# Patient Record
Sex: MALE | Race: WHITE | NOT HISPANIC OR LATINO
[De-identification: names, ages, dates, MRNs, and addresses within clinical notes are randomized per-mention and may not be internally consistent; named-entity substitution may affect disease eponyms.]

---

## 2017-06-21 ENCOUNTER — APPOINTMENT (OUTPATIENT)
Dept: ALLERGY | Facility: CLINIC | Age: 67
End: 2017-06-21

## 2017-06-21 VITALS
HEART RATE: 80 BPM | WEIGHT: 260 LBS | RESPIRATION RATE: 14 BRPM | BODY MASS INDEX: 40.81 KG/M2 | HEIGHT: 67 IN | DIASTOLIC BLOOD PRESSURE: 80 MMHG | SYSTOLIC BLOOD PRESSURE: 136 MMHG

## 2017-06-21 RX ORDER — LEVOTHYROXINE SODIUM 137 UG/1
TABLET ORAL
Refills: 0 | Status: ACTIVE | COMMUNITY

## 2017-06-21 RX ORDER — TESTOSTERONE 10 MG/G
GEL TRANSDERMAL
Refills: 0 | Status: ACTIVE | COMMUNITY

## 2017-06-21 RX ORDER — GLIPIZIDE 2.5 MG/1
TABLET ORAL
Refills: 0 | Status: ACTIVE | COMMUNITY

## 2017-06-21 RX ORDER — LOSARTAN POTASSIUM 100 MG/1
TABLET, FILM COATED ORAL
Refills: 0 | Status: DISCONTINUED | COMMUNITY
End: 2017-06-21

## 2017-07-10 ENCOUNTER — APPOINTMENT (OUTPATIENT)
Dept: ALLERGY | Facility: CLINIC | Age: 67
End: 2017-07-10

## 2017-07-10 VITALS
HEART RATE: 80 BPM | HEIGHT: 67 IN | RESPIRATION RATE: 14 BRPM | WEIGHT: 262 LBS | SYSTOLIC BLOOD PRESSURE: 146 MMHG | BODY MASS INDEX: 41.12 KG/M2 | DIASTOLIC BLOOD PRESSURE: 80 MMHG

## 2017-08-21 ENCOUNTER — APPOINTMENT (OUTPATIENT)
Dept: ALLERGY | Facility: CLINIC | Age: 67
End: 2017-08-21

## 2017-10-10 ENCOUNTER — APPOINTMENT (OUTPATIENT)
Dept: ALLERGY | Facility: CLINIC | Age: 67
End: 2017-10-10
Payer: MEDICARE

## 2017-10-10 VITALS
RESPIRATION RATE: 14 BRPM | BODY MASS INDEX: 25.43 KG/M2 | DIASTOLIC BLOOD PRESSURE: 82 MMHG | WEIGHT: 162 LBS | SYSTOLIC BLOOD PRESSURE: 140 MMHG | HEART RATE: 68 BPM | HEIGHT: 67 IN

## 2017-10-10 PROCEDURE — 99214 OFFICE O/P EST MOD 30 MIN: CPT

## 2017-10-19 ENCOUNTER — APPOINTMENT (OUTPATIENT)
Dept: ALLERGY | Facility: CLINIC | Age: 67
End: 2017-10-19
Payer: MEDICARE

## 2017-10-19 PROCEDURE — 99214 OFFICE O/P EST MOD 30 MIN: CPT | Mod: 25

## 2017-10-19 PROCEDURE — 96401 CHEMO ANTI-NEOPL SQ/IM: CPT

## 2017-10-24 ENCOUNTER — MED ADMIN CHARGE (OUTPATIENT)
Age: 67
End: 2017-10-24

## 2017-10-24 RX ORDER — OMALIZUMAB 202.5 MG/1.4ML
150 INJECTION, SOLUTION SUBCUTANEOUS
Qty: 1 | Refills: 0 | Status: COMPLETED | OUTPATIENT
Start: 2017-10-24

## 2017-10-24 RX ADMIN — OMALIZUMAB 1 MG: 202.5 INJECTION, SOLUTION SUBCUTANEOUS at 00:00

## 2017-11-16 ENCOUNTER — MED ADMIN CHARGE (OUTPATIENT)
Age: 67
End: 2017-11-16

## 2017-11-16 ENCOUNTER — APPOINTMENT (OUTPATIENT)
Dept: ALLERGY | Facility: CLINIC | Age: 67
End: 2017-11-16
Payer: MEDICARE

## 2017-11-16 VITALS
RESPIRATION RATE: 14 BRPM | DIASTOLIC BLOOD PRESSURE: 80 MMHG | SYSTOLIC BLOOD PRESSURE: 140 MMHG | HEIGHT: 67 IN | HEART RATE: 72 BPM | WEIGHT: 265 LBS | BODY MASS INDEX: 41.59 KG/M2

## 2017-11-16 PROCEDURE — 96372 THER/PROPH/DIAG INJ SC/IM: CPT | Mod: 59

## 2017-11-16 PROCEDURE — 99214 OFFICE O/P EST MOD 30 MIN: CPT | Mod: 25

## 2017-11-16 RX ORDER — OMALIZUMAB 202.5 MG/1.4ML
150 INJECTION, SOLUTION SUBCUTANEOUS
Qty: 1 | Refills: 0 | Status: COMPLETED | OUTPATIENT
Start: 2017-11-16

## 2017-11-16 RX ORDER — ATORVASTATIN CALCIUM 80 MG/1
TABLET, FILM COATED ORAL
Refills: 0 | Status: ACTIVE | COMMUNITY

## 2017-11-16 RX ORDER — PREDNISONE 10 MG/1
10 TABLET ORAL
Qty: 27 | Refills: 0 | Status: DISCONTINUED | COMMUNITY
Start: 2017-07-10 | End: 2017-11-16

## 2017-11-16 RX ORDER — PREDNISONE 10 MG/1
10 TABLET ORAL
Qty: 30 | Refills: 0 | Status: DISCONTINUED | COMMUNITY
Start: 2017-06-21 | End: 2017-11-16

## 2017-11-16 RX ADMIN — OMALIZUMAB 1 MG: 202.5 INJECTION, SOLUTION SUBCUTANEOUS at 00:00

## 2017-11-22 ENCOUNTER — MESSAGE (OUTPATIENT)
Age: 67
End: 2017-11-22

## 2017-12-14 ENCOUNTER — APPOINTMENT (OUTPATIENT)
Dept: ALLERGY | Facility: CLINIC | Age: 67
End: 2017-12-14
Payer: MEDICARE

## 2017-12-14 PROCEDURE — 95117 IMMUNOTHERAPY INJECTIONS: CPT

## 2017-12-14 PROCEDURE — 96372 THER/PROPH/DIAG INJ SC/IM: CPT | Mod: 59

## 2017-12-14 PROCEDURE — 99213 OFFICE O/P EST LOW 20 MIN: CPT | Mod: 25

## 2017-12-18 RX ORDER — OMALIZUMAB 202.5 MG/1.4ML
150 INJECTION, SOLUTION SUBCUTANEOUS
Qty: 1 | Refills: 0 | Status: COMPLETED | OUTPATIENT
Start: 2017-12-18

## 2017-12-18 RX ADMIN — OMALIZUMAB 150 MG: 202.5 INJECTION, SOLUTION SUBCUTANEOUS at 00:00

## 2018-01-11 ENCOUNTER — APPOINTMENT (OUTPATIENT)
Dept: ALLERGY | Facility: CLINIC | Age: 68
End: 2018-01-11

## 2018-01-18 ENCOUNTER — APPOINTMENT (OUTPATIENT)
Dept: ALLERGY | Facility: CLINIC | Age: 68
End: 2018-01-18
Payer: MEDICARE

## 2018-01-18 VITALS — HEART RATE: 78 BPM | SYSTOLIC BLOOD PRESSURE: 138 MMHG | DIASTOLIC BLOOD PRESSURE: 80 MMHG

## 2018-01-18 PROCEDURE — 96372 THER/PROPH/DIAG INJ SC/IM: CPT | Mod: 59

## 2018-01-18 RX ORDER — OMALIZUMAB 202.5 MG/1.4ML
150 INJECTION, SOLUTION SUBCUTANEOUS
Qty: 1 | Refills: 0 | Status: COMPLETED | OUTPATIENT
Start: 2018-01-18

## 2018-01-18 RX ADMIN — OMALIZUMAB 0 MG: 202.5 INJECTION, SOLUTION SUBCUTANEOUS at 00:00

## 2018-02-15 ENCOUNTER — APPOINTMENT (OUTPATIENT)
Dept: ALLERGY | Facility: CLINIC | Age: 68
End: 2018-02-15
Payer: MEDICARE

## 2018-02-15 PROCEDURE — 96372 THER/PROPH/DIAG INJ SC/IM: CPT | Mod: 59

## 2018-02-15 RX ORDER — OMALIZUMAB 202.5 MG/1.4ML
150 INJECTION, SOLUTION SUBCUTANEOUS
Qty: 1 | Refills: 0 | Status: COMPLETED | OUTPATIENT
Start: 2018-02-15

## 2018-02-15 RX ADMIN — OMALIZUMAB 0 MG: 202.5 INJECTION, SOLUTION SUBCUTANEOUS at 00:00

## 2018-03-15 ENCOUNTER — APPOINTMENT (OUTPATIENT)
Dept: ALLERGY | Facility: CLINIC | Age: 68
End: 2018-03-15
Payer: MEDICARE

## 2018-03-15 PROCEDURE — 96372 THER/PROPH/DIAG INJ SC/IM: CPT

## 2018-03-15 PROCEDURE — 99213 OFFICE O/P EST LOW 20 MIN: CPT | Mod: 25

## 2018-03-15 RX ORDER — OMALIZUMAB 202.5 MG/1.4ML
150 INJECTION, SOLUTION SUBCUTANEOUS
Qty: 1 | Refills: 0 | Status: COMPLETED | OUTPATIENT
Start: 2018-03-15 | End: 2018-03-15

## 2018-03-15 RX ADMIN — OMALIZUMAB 0 MG: 202.5 INJECTION, SOLUTION SUBCUTANEOUS at 00:00

## 2018-03-16 RX ORDER — OMALIZUMAB 202.5 MG/1.4ML
150 INJECTION, SOLUTION SUBCUTANEOUS
Qty: 1 | Refills: 0 | Status: COMPLETED | OUTPATIENT
Start: 2018-03-15

## 2018-04-12 ENCOUNTER — APPOINTMENT (OUTPATIENT)
Dept: ALLERGY | Facility: CLINIC | Age: 68
End: 2018-04-12
Payer: MEDICARE

## 2018-04-12 PROCEDURE — 96372 THER/PROPH/DIAG INJ SC/IM: CPT | Mod: 59

## 2018-04-12 PROCEDURE — 99213 OFFICE O/P EST LOW 20 MIN: CPT | Mod: 25

## 2018-04-12 RX ORDER — AZELASTINE HYDROCHLORIDE 0.5 MG/ML
0.05 SOLUTION/ DROPS OPHTHALMIC
Qty: 1 | Refills: 2 | Status: ACTIVE | COMMUNITY
Start: 2018-04-12 | End: 1900-01-01

## 2018-04-12 RX ADMIN — OMALIZUMAB 0 MG: 202.5 INJECTION, SOLUTION SUBCUTANEOUS at 00:00

## 2018-05-02 RX ORDER — OMALIZUMAB 202.5 MG/1.4ML
150 INJECTION, SOLUTION SUBCUTANEOUS
Qty: 1 | Refills: 0 | Status: COMPLETED | OUTPATIENT
Start: 2018-04-12

## 2018-05-03 RX ORDER — OMALIZUMAB 202.5 MG/1.4ML
150 INJECTION, SOLUTION SUBCUTANEOUS
Qty: 1 | Refills: 0 | Status: COMPLETED | OUTPATIENT
Start: 2018-04-12

## 2018-05-10 ENCOUNTER — APPOINTMENT (OUTPATIENT)
Dept: ALLERGY | Facility: CLINIC | Age: 68
End: 2018-05-10

## 2018-05-15 ENCOUNTER — APPOINTMENT (OUTPATIENT)
Dept: ALLERGY | Facility: CLINIC | Age: 68
End: 2018-05-15
Payer: MEDICARE

## 2018-05-15 VITALS — HEART RATE: 72 BPM | DIASTOLIC BLOOD PRESSURE: 80 MMHG | SYSTOLIC BLOOD PRESSURE: 140 MMHG

## 2018-05-15 PROCEDURE — 96372 THER/PROPH/DIAG INJ SC/IM: CPT

## 2018-05-15 RX ORDER — OMALIZUMAB 202.5 MG/1.4ML
150 INJECTION, SOLUTION SUBCUTANEOUS
Qty: 1 | Refills: 0 | Status: COMPLETED | OUTPATIENT
Start: 2018-05-15

## 2018-05-15 RX ADMIN — OMALIZUMAB 0 MG: 202.5 INJECTION, SOLUTION SUBCUTANEOUS at 00:00

## 2018-06-20 ENCOUNTER — APPOINTMENT (OUTPATIENT)
Dept: ALLERGY | Facility: CLINIC | Age: 68
End: 2018-06-20
Payer: MEDICARE

## 2018-06-20 VITALS — HEART RATE: 74 BPM | RESPIRATION RATE: 14 BRPM | DIASTOLIC BLOOD PRESSURE: 82 MMHG | SYSTOLIC BLOOD PRESSURE: 136 MMHG

## 2018-06-20 PROCEDURE — 96372 THER/PROPH/DIAG INJ SC/IM: CPT | Mod: 59

## 2018-06-20 RX ORDER — OMALIZUMAB 202.5 MG/1.4ML
150 INJECTION, SOLUTION SUBCUTANEOUS
Qty: 1 | Refills: 0 | Status: COMPLETED | OUTPATIENT
Start: 2018-06-20

## 2018-06-20 RX ADMIN — OMALIZUMAB 0 MG: 202.5 INJECTION, SOLUTION SUBCUTANEOUS at 00:00

## 2018-07-25 ENCOUNTER — APPOINTMENT (OUTPATIENT)
Dept: ALLERGY | Facility: CLINIC | Age: 68
End: 2018-07-25
Payer: MEDICARE

## 2018-07-25 PROCEDURE — 96372 THER/PROPH/DIAG INJ SC/IM: CPT

## 2018-07-25 RX ORDER — OMALIZUMAB 202.5 MG/1.4ML
150 INJECTION, SOLUTION SUBCUTANEOUS
Qty: 1 | Refills: 0 | Status: COMPLETED | OUTPATIENT
Start: 2018-07-25

## 2018-07-25 RX ADMIN — OMALIZUMAB 0 MG: 202.5 INJECTION, SOLUTION SUBCUTANEOUS at 00:00

## 2018-08-30 ENCOUNTER — APPOINTMENT (OUTPATIENT)
Dept: ALLERGY | Facility: CLINIC | Age: 68
End: 2018-08-30
Payer: MEDICARE

## 2018-08-30 PROCEDURE — 96372 THER/PROPH/DIAG INJ SC/IM: CPT

## 2018-09-12 VITALS — DIASTOLIC BLOOD PRESSURE: 80 MMHG | RESPIRATION RATE: 16 BRPM | SYSTOLIC BLOOD PRESSURE: 150 MMHG | HEART RATE: 72 BPM

## 2018-09-12 RX ORDER — OMALIZUMAB 202.5 MG/1.4ML
150 INJECTION, SOLUTION SUBCUTANEOUS
Qty: 1 | Refills: 0 | Status: COMPLETED | OUTPATIENT
Start: 2018-09-12

## 2018-09-12 RX ADMIN — OMALIZUMAB 0 MG: 202.5 INJECTION, SOLUTION SUBCUTANEOUS at 00:00

## 2018-10-04 ENCOUNTER — APPOINTMENT (OUTPATIENT)
Dept: ALLERGY | Facility: CLINIC | Age: 68
End: 2018-10-04
Payer: MEDICARE

## 2018-10-04 PROCEDURE — 96372 THER/PROPH/DIAG INJ SC/IM: CPT | Mod: 59

## 2018-10-08 VITALS — DIASTOLIC BLOOD PRESSURE: 84 MMHG | SYSTOLIC BLOOD PRESSURE: 144 MMHG | HEART RATE: 77 BPM | RESPIRATION RATE: 14 BRPM

## 2018-10-08 RX ORDER — OMALIZUMAB 202.5 MG/1.4ML
150 INJECTION, SOLUTION SUBCUTANEOUS
Qty: 1 | Refills: 0 | Status: COMPLETED | OUTPATIENT
Start: 2018-10-08

## 2018-10-08 RX ADMIN — OMALIZUMAB 0 MG: 202.5 INJECTION, SOLUTION SUBCUTANEOUS at 00:00

## 2018-11-08 ENCOUNTER — APPOINTMENT (OUTPATIENT)
Dept: ALLERGY | Facility: CLINIC | Age: 68
End: 2018-11-08
Payer: MEDICARE

## 2018-11-08 VITALS — RESPIRATION RATE: 14 BRPM | DIASTOLIC BLOOD PRESSURE: 82 MMHG | SYSTOLIC BLOOD PRESSURE: 140 MMHG

## 2018-11-08 PROCEDURE — 96372 THER/PROPH/DIAG INJ SC/IM: CPT | Mod: 59

## 2018-11-08 RX ORDER — OMALIZUMAB 202.5 MG/1.4ML
150 INJECTION, SOLUTION SUBCUTANEOUS
Qty: 1 | Refills: 0 | Status: COMPLETED | OUTPATIENT
Start: 2018-11-08

## 2018-11-08 RX ADMIN — OMALIZUMAB 0 MG: 202.5 INJECTION, SOLUTION SUBCUTANEOUS at 00:00

## 2018-11-21 ENCOUNTER — APPOINTMENT (OUTPATIENT)
Dept: ORTHOPEDIC SURGERY | Facility: CLINIC | Age: 68
End: 2018-11-21
Payer: MEDICARE

## 2018-11-21 VITALS — HEIGHT: 67 IN | WEIGHT: 265 LBS | BODY MASS INDEX: 41.59 KG/M2

## 2018-11-21 DIAGNOSIS — Z00.00 ENCOUNTER FOR GENERAL ADULT MEDICAL EXAMINATION W/OUT ABNORMAL FINDINGS: ICD-10-CM

## 2018-11-21 PROCEDURE — 72170 X-RAY EXAM OF PELVIS: CPT

## 2018-11-21 PROCEDURE — 99213 OFFICE O/P EST LOW 20 MIN: CPT

## 2018-11-21 PROCEDURE — 72100 X-RAY EXAM L-S SPINE 2/3 VWS: CPT

## 2018-12-11 ENCOUNTER — APPOINTMENT (OUTPATIENT)
Dept: ALLERGY | Facility: CLINIC | Age: 68
End: 2018-12-11
Payer: MEDICARE

## 2018-12-11 VITALS — RESPIRATION RATE: 14 BRPM | SYSTOLIC BLOOD PRESSURE: 138 MMHG | DIASTOLIC BLOOD PRESSURE: 80 MMHG | HEART RATE: 80 BPM

## 2018-12-11 PROCEDURE — 96372 THER/PROPH/DIAG INJ SC/IM: CPT

## 2018-12-11 PROCEDURE — 99213 OFFICE O/P EST LOW 20 MIN: CPT | Mod: 25

## 2018-12-11 RX ORDER — OMALIZUMAB 202.5 MG/1.4ML
150 INJECTION, SOLUTION SUBCUTANEOUS
Qty: 1 | Refills: 0 | Status: COMPLETED | OUTPATIENT
Start: 2018-12-11

## 2018-12-11 RX ADMIN — OMALIZUMAB 0 MG: 202.5 INJECTION, SOLUTION SUBCUTANEOUS at 00:00

## 2018-12-20 ENCOUNTER — APPOINTMENT (OUTPATIENT)
Dept: ORTHOPEDIC SURGERY | Facility: CLINIC | Age: 68
End: 2018-12-20
Payer: MEDICARE

## 2018-12-20 VITALS — BODY MASS INDEX: 41.59 KG/M2 | WEIGHT: 265 LBS | HEIGHT: 67 IN

## 2018-12-20 PROCEDURE — 99213 OFFICE O/P EST LOW 20 MIN: CPT

## 2019-01-17 ENCOUNTER — APPOINTMENT (OUTPATIENT)
Dept: ALLERGY | Facility: CLINIC | Age: 69
End: 2019-01-17
Payer: MEDICARE

## 2019-01-17 VITALS — RESPIRATION RATE: 14 BRPM | DIASTOLIC BLOOD PRESSURE: 84 MMHG | HEART RATE: 70 BPM | SYSTOLIC BLOOD PRESSURE: 142 MMHG

## 2019-01-17 PROCEDURE — 96372 THER/PROPH/DIAG INJ SC/IM: CPT | Mod: 59

## 2019-01-17 RX ORDER — OMALIZUMAB 202.5 MG/1.4ML
150 INJECTION, SOLUTION SUBCUTANEOUS
Qty: 1 | Refills: 0 | Status: COMPLETED | OUTPATIENT
Start: 2019-01-17

## 2019-01-17 RX ADMIN — OMALIZUMAB 0 MG: 202.5 INJECTION, SOLUTION SUBCUTANEOUS at 00:00

## 2019-01-23 ENCOUNTER — APPOINTMENT (OUTPATIENT)
Dept: ORTHOPEDIC SURGERY | Facility: CLINIC | Age: 69
End: 2019-01-23
Payer: MEDICARE

## 2019-01-23 VITALS — WEIGHT: 265 LBS | BODY MASS INDEX: 41.59 KG/M2 | HEIGHT: 67 IN

## 2019-01-23 DIAGNOSIS — S69.82XA OTHER SPECIFIED INJURIES OF LEFT WRIST, HAND AND FINGER(S), INITIAL ENCOUNTER: ICD-10-CM

## 2019-01-23 DIAGNOSIS — S66.802A UNSPECIFIED INJURY OF OTHER SPECIFIED MUSCLES, FASCIA AND TENDONS AT WRIST AND HAND LEVEL, LEFT HAND, INITIAL ENCOUNTER: ICD-10-CM

## 2019-01-23 DIAGNOSIS — M77.8 OTHER ENTHESOPATHIES, NOT ELSEWHERE CLASSIFIED: ICD-10-CM

## 2019-01-23 PROCEDURE — 99213 OFFICE O/P EST LOW 20 MIN: CPT

## 2019-01-23 PROCEDURE — 73110 X-RAY EXAM OF WRIST: CPT | Mod: LT

## 2019-01-23 NOTE — HISTORY OF PRESENT ILLNESS
[de-identified] : Patient presents with left wrist pain following an overexertion while bowling 3 days ago. Since then, he has been resting and wearing wrist brace and is not 80% better. With regard to his back pain, he has received authorization and will be starting physical therapy.He never went to physical therapy for his back as there was insurance issue

## 2019-01-23 NOTE — ADDENDUM
[FreeTextEntry1] : I, Maxi Putnam , acted solely as a scribe for Dr. Hardy Henning on this date 01/23/2019.\par All medical record entries made by the Scribe were at my, Dr. Hardy Henning, direction and personally dictated by me on 01/23/2019. I have reviewed the chart and agree that the record accurately reflects my personal performance of the history, physical exam, assessment and plan. I have also personally directed, reviewed, and agreed with the chart.

## 2019-01-23 NOTE — DISCUSSION/SUMMARY
[de-identified] : We discussed the use of ice to relieve pain. I instructed him in home exercises for his left wrist using light dumbbells. A cockup wrist splint was provided. If symptoms persist in 1 month, a cortisone injection will be considered.

## 2019-01-23 NOTE — PHYSICAL EXAM
[LE] : Sensory: Intact in bilateral lower extremities [de-identified] : Constitutional\par o Appearance : well-nourished, well developed, alert, in no acute distress \par Head and Face\par o Head :\par ¦ Inspection : atraumatic, normocephalic\par o Face :\par ¦ Inspection : no visible rash or discoloration\par Respiratory\par o Respiratory Effort: breathing unlabored \par Neurologic\par o Sensation : Normal sensation \par Psychiatric\par o Mood and Affect: mood normal, affect appropriate \par Lymphatic\par o Additional Nodes : No palpable lymph nodes present \par \par Right Upper Extremity\par o Right Wrist:\par ¦ Inspection/Palpation : no tenderness, swelling or deformities\par ¦ Range of Motion : full and painless in all planes, no crepitance\par ¦ Strength : extension, flexion, ulnar deviation and radial deviation 5/5\par ¦ Stability : no joint instability on provocative testing\par ¦ Tests/Signs : Tinel's sign negative over carpal tunnel , negative Phalen’s and Finkelstein’s test\par \par o Right Hand :\par ¦ Inspection/Palpation : no tenderness to palpation or pain with axial compression\par ¦ Range of Motion : full arc of motion in the small joints of the hand, no discomfort elicited\par ¦ Strength : all intrinsic and extrinsic hand muscles 5/5\par ¦ Stability : no joint instability on provocative testing\par o Sensation : sensation intact to light touch\par o Skin : no skin lesions or discoloration \par \par Left Upper Extremity \par o Left Wrist:\par ¦ Inspection/Palpation : extensor carpi ulnaris tendon tenderness mild  swelling or deformities\par ¦ Range of Motion : full in all planes, pain with full dorsiflexion and ulnar deviation  no crepitance\par ¦ Strength : extension, flexion, ulnar deviation and radial deviation 4-/5\par ¦ Stability : no joint instability on provocative testing\par ¦ Tests/Signs : Tinel's sign negative over carpal tunnel, negative Phalen’s and Finkelstein’s test\par \par o Left Hand :\par ¦ Inspection/Palpation : no tenderness to palpation or pain with axial compression, good  strength\par ¦ Range of Motion : full arc of motion in the small joints of the hand, no discomfort elicited\par ¦ Strength : all intrinsic and extrinsic hand muscles 5/5\par ¦ Stability : no joint instability on provocative testing \par o Sensation : sensation intact to light touch\par o Skin : no skin lesions or discoloration\par \par Radiology:\par Left Wrist: AP, lateral and oblique views of the wrist were obtained and reveal radial carpal arthritis with evidence of old radial styloid fracture. There is mild basal joint arthritis of the thumb and metacarpal joints

## 2019-02-27 ENCOUNTER — APPOINTMENT (OUTPATIENT)
Dept: ORTHOPEDIC SURGERY | Facility: CLINIC | Age: 69
End: 2019-02-27
Payer: MEDICARE

## 2019-02-27 VITALS — BODY MASS INDEX: 41.59 KG/M2 | HEIGHT: 67 IN | WEIGHT: 265 LBS

## 2019-02-27 DIAGNOSIS — M19.039 PRIMARY OSTEOARTHRITIS, UNSPECIFIED WRIST: ICD-10-CM

## 2019-02-27 PROCEDURE — 99213 OFFICE O/P EST LOW 20 MIN: CPT

## 2019-02-28 ENCOUNTER — APPOINTMENT (OUTPATIENT)
Dept: ALLERGY | Facility: CLINIC | Age: 69
End: 2019-02-28
Payer: MEDICARE

## 2019-02-28 VITALS — DIASTOLIC BLOOD PRESSURE: 84 MMHG | SYSTOLIC BLOOD PRESSURE: 156 MMHG | HEART RATE: 80 BPM | RESPIRATION RATE: 16 BRPM

## 2019-02-28 PROCEDURE — 96372 THER/PROPH/DIAG INJ SC/IM: CPT | Mod: 59

## 2019-02-28 RX ORDER — OMALIZUMAB 202.5 MG/1.4ML
150 INJECTION, SOLUTION SUBCUTANEOUS
Qty: 1 | Refills: 0 | Status: COMPLETED | OUTPATIENT
Start: 2019-02-28

## 2019-02-28 RX ADMIN — OMALIZUMAB 0 MG: 202.5 INJECTION, SOLUTION SUBCUTANEOUS at 00:00

## 2019-03-13 ENCOUNTER — OTHER (OUTPATIENT)
Age: 69
End: 2019-03-13

## 2019-04-10 ENCOUNTER — APPOINTMENT (OUTPATIENT)
Dept: ORTHOPEDIC SURGERY | Facility: CLINIC | Age: 69
End: 2019-04-10
Payer: MEDICARE

## 2019-04-10 VITALS — BODY MASS INDEX: 41.59 KG/M2 | WEIGHT: 265 LBS | HEIGHT: 67 IN

## 2019-04-10 PROCEDURE — 99213 OFFICE O/P EST LOW 20 MIN: CPT

## 2019-04-10 PROCEDURE — 72040 X-RAY EXAM NECK SPINE 2-3 VW: CPT

## 2019-04-11 ENCOUNTER — APPOINTMENT (OUTPATIENT)
Dept: ALLERGY | Facility: CLINIC | Age: 69
End: 2019-04-11
Payer: MEDICARE

## 2019-04-11 VITALS — DIASTOLIC BLOOD PRESSURE: 90 MMHG | HEART RATE: 90 BPM | RESPIRATION RATE: 16 BRPM | SYSTOLIC BLOOD PRESSURE: 140 MMHG

## 2019-04-11 PROCEDURE — 99214 OFFICE O/P EST MOD 30 MIN: CPT | Mod: 25

## 2019-04-11 PROCEDURE — 96372 THER/PROPH/DIAG INJ SC/IM: CPT

## 2019-04-11 RX ORDER — OMALIZUMAB 202.5 MG/1.4ML
150 INJECTION, SOLUTION SUBCUTANEOUS
Qty: 1 | Refills: 0 | Status: COMPLETED | OUTPATIENT
Start: 2019-04-11

## 2019-04-11 RX ORDER — IRBESARTAN 75 MG/1
75 TABLET ORAL DAILY
Qty: 30 | Refills: 0 | Status: ACTIVE | COMMUNITY
Start: 2019-04-11 | End: 1900-01-01

## 2019-04-11 RX ADMIN — OMALIZUMAB 0 MG: 202.5 INJECTION, SOLUTION SUBCUTANEOUS at 00:00

## 2019-04-11 NOTE — ASSESSMENT
[FreeTextEntry1] : Chronic idiopathic urticaria/angioedema well controlled with Xolair every six weeks.\par \par Patient to return for oral challenge to irbesartan - will start with 75 mg - 1/4 tablet and gradually increase over time.

## 2019-04-11 NOTE — SOCIAL HISTORY
[Spouse/Partner] : spouse/partner [] :  [FreeTextEntry2] : retired - part time   [Smokers in Household] : there are no smokers in the home

## 2019-04-11 NOTE — PHYSICAL EXAM
[Alert] : alert [No Acute Distress] : no acute distress [Normal Lips/Tongue] : the lips and tongue were normal [Normal Tonsils] : normal tonsils [No Oral Lesions or Ulcers] : no oral lesions or ulcers [No Neck Mass] : no neck mass was observed [No LAD] : no lymphadenopathy [No Thyroid Mass] : no thyroid mass [Supple] : the neck was supple [Normal Rate and Effort] : normal respiratory rhythm and effort [No Crackles] : no crackles [Normal Rate] : heart rate was normal  [Normal S1, S2] : normal S1 and S2 [Regular Rhythm] : with a regular rhythm [Normal Cervical Lymph Nodes] : cervical [No clubbing] : no clubbing [No Edema] : no edema [No Cyanosis] : no cyanosis [Normal Mood] : mood was normal [Normal Affect] : affect was normal [Judgment and Insight Age Appropriate] : judgement and insight is age appropriate [Alert, Awake, Oriented as Age-Appropriate] : alert, awake, oriented as age appropriate [Wheezing] : no wheezing was heard [de-identified] : obese male  [de-identified] : no hives or swelling

## 2019-04-11 NOTE — HISTORY OF PRESENT ILLNESS
[de-identified] : Patient has developed significant proteinuria after discussion with his endocrinologist Dr. De La Torre.   Concern about deteriorating renal function and desire to start ARB medication.   Historically patient's recurrent urticaria/angioedema continued for years after coming off ARB medical therapy and he is now under excellent control with Xolair every six weeks.

## 2019-04-18 ENCOUNTER — APPOINTMENT (OUTPATIENT)
Dept: ALLERGY | Facility: CLINIC | Age: 69
End: 2019-04-18
Payer: MEDICARE

## 2019-04-18 PROCEDURE — 95076 INGEST CHALLENGE INI 120 MIN: CPT

## 2019-04-18 NOTE — IMPRESSION
[FreeTextEntry2] : Patient tolerated oral challenge to irbesartan 17.5 mg with no immediate allergic reaction

## 2019-04-18 NOTE — ASSESSMENT
[FreeTextEntry1] : Mr. Webber will continue with present dosage for one week and RV in one week for advancement in dosage.

## 2019-04-25 ENCOUNTER — APPOINTMENT (OUTPATIENT)
Dept: ALLERGY | Facility: CLINIC | Age: 69
End: 2019-04-25
Payer: MEDICARE

## 2019-04-25 PROCEDURE — 95076 INGEST CHALLENGE INI 120 MIN: CPT

## 2019-04-25 PROCEDURE — 96372 THER/PROPH/DIAG INJ SC/IM: CPT | Mod: 76

## 2019-04-25 NOTE — ASSESSMENT
[FreeTextEntry1] : Chronic urticaria - \par \par Patient tolerated oral challenge to irbesartan at 37.5 mg.   He will continue with this dosage for one week and call in one week for further advice.

## 2019-04-25 NOTE — IMPRESSION
[FreeTextEntry1] : Patient tolerated his oral challenge to 37.5 mg of irbesartan with no immediate allergic reaction.

## 2019-04-25 NOTE — REASON FOR VISIT
[Routine Follow-Up] : a routine follow-up visit for [FreeTextEntry3] : patient here for drug challenge to irbesartan

## 2019-05-08 ENCOUNTER — OTHER (OUTPATIENT)
Age: 69
End: 2019-05-08

## 2019-05-16 ENCOUNTER — OTHER (OUTPATIENT)
Age: 69
End: 2019-05-16

## 2019-05-22 ENCOUNTER — APPOINTMENT (OUTPATIENT)
Dept: ORTHOPEDIC SURGERY | Facility: CLINIC | Age: 69
End: 2019-05-22
Payer: MEDICARE

## 2019-05-22 VITALS — WEIGHT: 265 LBS | BODY MASS INDEX: 41.59 KG/M2 | HEIGHT: 67 IN

## 2019-05-22 PROCEDURE — 99213 OFFICE O/P EST LOW 20 MIN: CPT

## 2019-05-23 ENCOUNTER — APPOINTMENT (OUTPATIENT)
Dept: ALLERGY | Facility: CLINIC | Age: 69
End: 2019-05-23
Payer: MEDICARE

## 2019-05-23 VITALS
OXYGEN SATURATION: 98 % | SYSTOLIC BLOOD PRESSURE: 124 MMHG | RESPIRATION RATE: 14 BRPM | HEART RATE: 74 BPM | DIASTOLIC BLOOD PRESSURE: 72 MMHG

## 2019-05-23 PROCEDURE — 99214 OFFICE O/P EST MOD 30 MIN: CPT | Mod: 25

## 2019-05-23 PROCEDURE — 96372 THER/PROPH/DIAG INJ SC/IM: CPT

## 2019-05-23 RX ORDER — OMALIZUMAB 202.5 MG/1.4ML
150 INJECTION, SOLUTION SUBCUTANEOUS
Qty: 1 | Refills: 0 | Status: COMPLETED | OUTPATIENT
Start: 2019-05-23

## 2019-05-23 RX ORDER — AZELASTINE HYDROCHLORIDE 137 UG/1
0.1 SPRAY, METERED NASAL TWICE DAILY
Qty: 1 | Refills: 3 | Status: ACTIVE | COMMUNITY
Start: 2019-05-23 | End: 1900-01-01

## 2019-05-23 RX ADMIN — OMALIZUMAB 0 MG: 202.5 INJECTION, SOLUTION SUBCUTANEOUS at 00:00

## 2019-05-23 NOTE — PHYSICAL EXAM
[Alert] : alert [No Acute Distress] : no acute distress [Normal Lips/Tongue] : the lips and tongue were normal [Normal Tonsils] : normal tonsils [No Oral Lesions or Ulcers] : no oral lesions or ulcers [Boggy Nasal Turbinates] : boggy and/or pale nasal turbinates [Posterior Pharyngeal Cobblestoning] : posterior pharyngeal cobblestoning [No Neck Mass] : no neck mass was observed [No LAD] : no lymphadenopathy [No Thyroid Mass] : no thyroid mass [Supple] : the neck was supple [Normal Rate and Effort] : normal respiratory rhythm and effort [No Crackles] : no crackles [Wheezing] : no wheezing was heard [Normal Rate] : heart rate was normal  [Normal S1, S2] : normal S1 and S2 [Regular Rhythm] : with a regular rhythm [Normal Cervical Lymph Nodes] : cervical [No clubbing] : no clubbing [No Edema] : no edema [No Cyanosis] : no cyanosis [Normal Mood] : mood was normal [Normal Affect] : affect was normal [Judgment and Insight Age Appropriate] : judgement and insight is age appropriate [Alert, Awake, Oriented as Age-Appropriate] : alert, awake, oriented as age appropriate [de-identified] : obese male  [de-identified] : left sided mild lower rib discomfort with deep palpation  [de-identified] : no hives or swelling

## 2019-05-23 NOTE — HISTORY OF PRESENT ILLNESS
[Asthma] : asthma [Eczematous rashes] : eczematous rashes [Venom Reactions] : venom reactions [Food Allergies] : food allergies [de-identified] : Taking Allegra in AM and occasional Zyrtec at night - patient now with mucus in throat - eyes itchy - constant throat clearing - patient taking irbesartan 150 mg daily now -  he also notes some left sided lower rib pain which he attributes to coughing - about 70% better now.

## 2019-05-23 NOTE — SOCIAL HISTORY
[Spouse/Partner] : spouse/partner [FreeTextEntry2] : retired - part time   [] :  [Smokers in Household] : there are no smokers in the home

## 2019-05-23 NOTE — PHYSICAL EXAM
[Alert] : alert [No Acute Distress] : no acute distress [Normal Lips/Tongue] : the lips and tongue were normal [Normal Tonsils] : normal tonsils [No Oral Lesions or Ulcers] : no oral lesions or ulcers [Boggy Nasal Turbinates] : boggy and/or pale nasal turbinates [Posterior Pharyngeal Cobblestoning] : posterior pharyngeal cobblestoning [No Neck Mass] : no neck mass was observed [No LAD] : no lymphadenopathy [No Thyroid Mass] : no thyroid mass [Supple] : the neck was supple [Normal Rate and Effort] : normal respiratory rhythm and effort [No Crackles] : no crackles [Wheezing] : no wheezing was heard [Normal Rate] : heart rate was normal  [Normal S1, S2] : normal S1 and S2 [Regular Rhythm] : with a regular rhythm [Normal Cervical Lymph Nodes] : cervical [No clubbing] : no clubbing [No Edema] : no edema [No Cyanosis] : no cyanosis [Normal Mood] : mood was normal [Normal Affect] : affect was normal [Judgment and Insight Age Appropriate] : judgement and insight is age appropriate [Alert, Awake, Oriented as Age-Appropriate] : alert, awake, oriented as age appropriate [de-identified] : obese male  [de-identified] : left sided mild lower rib discomfort with deep palpation  [de-identified] : no hives or swelling

## 2019-05-23 NOTE — HISTORY OF PRESENT ILLNESS
[Asthma] : asthma [Eczematous rashes] : eczematous rashes [Venom Reactions] : venom reactions [Food Allergies] : food allergies [de-identified] : Taking Allegra in AM and occasional Zyrtec at night - patient now with mucus in throat - eyes itchy - constant throat clearing - patient taking irbesartan 150 mg daily now -  he also notes some left sided lower rib pain which he attributes to coughing - about 70% better now.

## 2019-05-23 NOTE — ASSESSMENT
[FreeTextEntry1] : Seasonal allergic rhinitis:\par \par Azelastine BID \par Continue Allegra QD\par \par Angioedema:\par \par Well controlled with Xolair\par Tolerating irbesartan \par \par Rib pain secondary to coughing

## 2019-07-02 ENCOUNTER — APPOINTMENT (OUTPATIENT)
Dept: ALLERGY | Facility: CLINIC | Age: 69
End: 2019-07-02
Payer: MEDICARE

## 2019-07-02 VITALS — HEART RATE: 74 BPM | SYSTOLIC BLOOD PRESSURE: 122 MMHG | RESPIRATION RATE: 14 BRPM | DIASTOLIC BLOOD PRESSURE: 82 MMHG

## 2019-07-02 PROCEDURE — 96372 THER/PROPH/DIAG INJ SC/IM: CPT

## 2019-07-02 RX ORDER — OMALIZUMAB 202.5 MG/1.4ML
150 INJECTION, SOLUTION SUBCUTANEOUS
Qty: 1 | Refills: 0 | Status: COMPLETED | OUTPATIENT
Start: 2019-07-02

## 2019-07-02 RX ADMIN — OMALIZUMAB 0 MG: 202.5 INJECTION, SOLUTION SUBCUTANEOUS at 00:00

## 2019-08-07 ENCOUNTER — APPOINTMENT (OUTPATIENT)
Dept: ORTHOPEDIC SURGERY | Facility: CLINIC | Age: 69
End: 2019-08-07

## 2019-08-20 ENCOUNTER — APPOINTMENT (OUTPATIENT)
Dept: ALLERGY | Facility: CLINIC | Age: 69
End: 2019-08-20
Payer: MEDICARE

## 2019-08-20 VITALS — DIASTOLIC BLOOD PRESSURE: 84 MMHG | SYSTOLIC BLOOD PRESSURE: 138 MMHG | RESPIRATION RATE: 14 BRPM | HEART RATE: 74 BPM

## 2019-08-20 PROCEDURE — 96372 THER/PROPH/DIAG INJ SC/IM: CPT

## 2019-08-20 RX ORDER — OMALIZUMAB 202.5 MG/1.4ML
150 INJECTION, SOLUTION SUBCUTANEOUS
Qty: 1 | Refills: 0 | Status: COMPLETED | OUTPATIENT
Start: 2019-08-20

## 2019-08-20 RX ADMIN — OMALIZUMAB 0 MG: 202.5 INJECTION, SOLUTION SUBCUTANEOUS at 00:00

## 2019-10-01 ENCOUNTER — APPOINTMENT (OUTPATIENT)
Dept: ALLERGY | Facility: CLINIC | Age: 69
End: 2019-10-01
Payer: MEDICARE

## 2019-10-01 VITALS — DIASTOLIC BLOOD PRESSURE: 82 MMHG | RESPIRATION RATE: 14 BRPM | SYSTOLIC BLOOD PRESSURE: 118 MMHG | HEART RATE: 77 BPM

## 2019-10-01 PROCEDURE — 96372 THER/PROPH/DIAG INJ SC/IM: CPT

## 2019-10-01 RX ORDER — OMALIZUMAB 202.5 MG/1.4ML
150 INJECTION, SOLUTION SUBCUTANEOUS
Qty: 1 | Refills: 0 | Status: COMPLETED | OUTPATIENT
Start: 2019-10-01

## 2019-10-01 RX ADMIN — OMALIZUMAB 0 MG: 202.5 INJECTION, SOLUTION SUBCUTANEOUS at 00:00

## 2019-11-05 ENCOUNTER — APPOINTMENT (OUTPATIENT)
Dept: ALLERGY | Facility: CLINIC | Age: 69
End: 2019-11-05
Payer: MEDICARE

## 2019-11-05 VITALS — DIASTOLIC BLOOD PRESSURE: 78 MMHG | SYSTOLIC BLOOD PRESSURE: 142 MMHG | RESPIRATION RATE: 14 BRPM | HEART RATE: 81 BPM

## 2019-11-05 PROCEDURE — 96372 THER/PROPH/DIAG INJ SC/IM: CPT

## 2019-11-05 RX ORDER — OMALIZUMAB 202.5 MG/1.4ML
150 INJECTION, SOLUTION SUBCUTANEOUS
Qty: 1 | Refills: 0 | Status: COMPLETED | OUTPATIENT
Start: 2019-11-05

## 2019-11-05 RX ADMIN — OMALIZUMAB 0 MG: 202.5 INJECTION, SOLUTION SUBCUTANEOUS at 00:00

## 2019-11-19 ENCOUNTER — MEDICATION RENEWAL (OUTPATIENT)
Age: 69
End: 2019-11-19

## 2019-11-26 ENCOUNTER — APPOINTMENT (OUTPATIENT)
Dept: ALLERGY | Facility: CLINIC | Age: 69
End: 2019-11-26

## 2019-12-03 ENCOUNTER — APPOINTMENT (OUTPATIENT)
Dept: ALLERGY | Facility: CLINIC | Age: 69
End: 2019-12-03
Payer: MEDICARE

## 2019-12-03 VITALS — SYSTOLIC BLOOD PRESSURE: 122 MMHG | RESPIRATION RATE: 14 BRPM | HEART RATE: 80 BPM | DIASTOLIC BLOOD PRESSURE: 68 MMHG

## 2019-12-03 PROCEDURE — 96372 THER/PROPH/DIAG INJ SC/IM: CPT

## 2019-12-03 RX ORDER — OMALIZUMAB 202.5 MG/1.4ML
150 INJECTION, SOLUTION SUBCUTANEOUS
Qty: 1 | Refills: 0 | Status: COMPLETED | OUTPATIENT
Start: 2019-12-03

## 2019-12-03 RX ADMIN — OMALIZUMAB 0 MG: 202.5 INJECTION, SOLUTION SUBCUTANEOUS at 00:00

## 2020-01-07 ENCOUNTER — APPOINTMENT (OUTPATIENT)
Dept: ALLERGY | Facility: CLINIC | Age: 70
End: 2020-01-07
Payer: MEDICARE

## 2020-01-07 VITALS
SYSTOLIC BLOOD PRESSURE: 128 MMHG | OXYGEN SATURATION: 98 % | DIASTOLIC BLOOD PRESSURE: 68 MMHG | RESPIRATION RATE: 14 BRPM | HEART RATE: 76 BPM

## 2020-01-07 PROCEDURE — 96372 THER/PROPH/DIAG INJ SC/IM: CPT

## 2020-01-07 RX ORDER — OMALIZUMAB 202.5 MG/1.4ML
150 INJECTION, SOLUTION SUBCUTANEOUS
Qty: 1 | Refills: 0 | Status: COMPLETED | OUTPATIENT
Start: 2020-01-07

## 2020-01-07 RX ADMIN — OMALIZUMAB 0 MG: 202.5 INJECTION, SOLUTION SUBCUTANEOUS at 00:00

## 2020-01-08 ENCOUNTER — APPOINTMENT (OUTPATIENT)
Dept: ORTHOPEDIC SURGERY | Facility: CLINIC | Age: 70
End: 2020-01-08
Payer: MEDICARE

## 2020-01-08 VITALS — WEIGHT: 265 LBS | BODY MASS INDEX: 41.59 KG/M2 | HEIGHT: 67 IN

## 2020-01-08 PROCEDURE — 72100 X-RAY EXAM L-S SPINE 2/3 VWS: CPT

## 2020-01-08 PROCEDURE — 72170 X-RAY EXAM OF PELVIS: CPT

## 2020-01-08 PROCEDURE — 99214 OFFICE O/P EST MOD 30 MIN: CPT

## 2020-01-13 ENCOUNTER — APPOINTMENT (OUTPATIENT)
Dept: ORTHOPEDIC SURGERY | Facility: CLINIC | Age: 70
End: 2020-01-13
Payer: MEDICARE

## 2020-01-13 VITALS — BODY MASS INDEX: 41.59 KG/M2 | HEIGHT: 67 IN | WEIGHT: 265 LBS

## 2020-01-13 DIAGNOSIS — M47.812 SPONDYLOSIS W/OUT MYELOPATHY OR RADICULOPATHY, CERVICAL REGION: ICD-10-CM

## 2020-01-13 PROCEDURE — 73030 X-RAY EXAM OF SHOULDER: CPT | Mod: RT

## 2020-01-13 PROCEDURE — 99213 OFFICE O/P EST LOW 20 MIN: CPT

## 2020-01-13 NOTE — HISTORY OF PRESENT ILLNESS
[de-identified] : 69 year old LHD patient presents complaining of right shoulder pain and neck stiffness. His neck has been bothering him for quite a while. His right shoulder started to bother him about 2 months ago but there was no injury. Feels weak when attempts to lift items and feels motion is restricted. He has pain and weakness. There are times where he cannot even hold a coffeepot in the right hand. His pain radiates into the bicep. He denies numbness and tingling. He does have pain when sleeping on his back but not directly on his right side. He has not been treated for the shoulder yet. Old xrays lumbar of the cervical  spine reveal. diffuse degenerative disc disease and facet arthropathy.

## 2020-01-13 NOTE — DISCUSSION/SUMMARY
[de-identified] : I discussed the underlying pathophysiology of the patient's condition in great detail with the patient. I went over the patient's x-rays with them in great detail. We discussed the use of ice, Tylenol and anti-inflammatories to relieve pain. The patient was instructed in ROM exercises they are to do at home. At-home strengthening exercises were discussed and demonstrated with the patient. At this time, he will start a course of physical therapy for strengthening and flexibility. A prescription for physical therapy was provided. We went over the wide ranging benefits of exercise for the patient health and I encouraged him to begin a diligent exercise program. I stressed the importance of continued weight loss and its benefits to the patient’s joints and overall health.  There is no considerable improvement with PT and anti-inflammatory meds and MRI will be obtained of his lumbosacral spine\par \par FU 1 month.

## 2020-01-13 NOTE — ADDENDUM
[FreeTextEntry1] : I, Bebeto Sparks, acted solely as a scribe for Dr. Henning on 01/13/2020  .\par  \par All medical record entries made by the scribe were at my, Dr. Henning, direction and personally dictated by me on 01/13/2020. I have reviewed the chart and agree that the record accurately reflects my personal performance of the history, physical exam, assessment and plan. I have also personally directed, reviewed, and agreed with the chart.

## 2020-01-13 NOTE — HISTORY OF PRESENT ILLNESS
[de-identified] : 69 year old LHD patient presents complaining of right shoulder pain and neck stiffness. His neck has been bothering him for quite a while. He was last seen in the Spring for his neck and notes that it has not changed. His right shoulder started to bother him about 2 months ago but there was no injury. He has pain and weakness. There are times where he cannot even hold a coffeepot in the right hand. His pain radiates into the bicep. He denies numbness and tingling. He does have pain when sleeping on his back but not directly on his right side. He has not been treated for the shoulder yet.

## 2020-01-13 NOTE — ADDENDUM
[FreeTextEntry1] : I, Sergio Guadarrama , acted solely as a scribe for Dr. Hardy Henning on this date 01/08/2020.\par All medical record entries made by the Scribe were at my, Dr. Hardy Henning, direction and personally dictated by me on 01/08/2020. I have reviewed the chart and agree that the record accurately reflects my personal performance of the history, physical exam, assessment and plan. I have also personally directed, reviewed, and agreed with the chart.

## 2020-01-13 NOTE — DISCUSSION/SUMMARY
[de-identified] : I discussed the underlying pathophysiology of the patient's condition in great detail with the patient. I went over the patient's x-rays with them in great detail. We discussed the use of ice, Tylenol and anti-inflammatories to relieve pain. The patient was instructed in ROM exercises they are to do at home. At-home strengthening exercises were discussed and demonstrated with the patient. At this time, he will start a course of physical therapy for strengthening and flexibility. A prescription for physical therapy was provided. We went over the wide ranging benefits of exercise for the patient health and I encouraged him to begin a diligent exercise program. I stressed the importance of continued weight loss and its benefits to the patient’s joints and overall health.  There is no considerable improvement with PT and anti-inflammatory meds and MRI will be obtained of his lumbosacral spine\par \par FU 1 month. \par \par MRI right shoulder to rule out rotator cuff tear. Fu with results.

## 2020-01-13 NOTE — PHYSICAL EXAM
[LE] : Sensory: Intact in bilateral lower extremities [de-identified] : Constitutional\par o Appearance : well-nourished, well developed, alert, in no acute distress \par Head and Face\par o Head :\par ¦ Inspection : atraumatic, normocephalic\par Neurologic\par o Mental Status Examination :\par ¦ Orientation : alert and oriented X 3\par Psychiatric\par o Mood and Affect: mood normal, affect appropriate \par Cardiovascular\par o Observation/Palpation : - no swelling,normal pulses, normal temperature \par Lymphatic\par o Additional Nodes : No palpable lymph nodes present \par \par Cervical Spine\par o Musculoskeletal Examination : full flexion and rotation of the cervical spine. mildleft and no right paracervical tenderness.\par o Inspection/Palpation :\par ¦ Inspection : alignment midline, normal degree of lordosis present\par ¦ Skin : normal appearance, no masses or tenderness, trachea midline\par ¦ Palpation : musculature is nontender to palpation\par o Range of Motion : limited rotation to right, no crepitance or pain with ROM\par \par \par Right Upper Extremity\par o Shoulder :\par ¦ Inspection/Palpation : minimal anterior capsular tenderness, swelling or deformities\par ¦ Range of Motion : full and painless in all planes, no crepitance\par ¦ Strength : forward elevation, internal rotation, external rotation 4+, adduction and abduction 4-/5, abduction/forward flexion /supaspinatus 4-/5, biceps triceps 5/5\par ¦ Stability : no joint instability on provocative testing \par Tests: Anaya negative, Neer +, negative Kai test \par \par Left Upper Extremity\par o Shoulder :\par ¦ Inspection/Palpation : minimal anterior capsular tenderness, swelling or deformities\par ¦ Range of Motion : full and painless in all planes, no crepitance\par ¦ Strength : forward elevation, internal rotation, external rotation, adduction and abduction 5/5, biceps and triceps 5/5 \par ¦ Stability : no joint instability on provocative testing\par  Tests: Anaya negative, Neer negative\par \par Gait: normal gait, no significant extremity swelling or lymphedema \par \par Radiology Results \par o Right Shoulder : AP internal and external and lateral views were obtained and revealed moderate arthritis and subacromial spur. \par

## 2020-01-13 NOTE — PHYSICAL EXAM
[LE] : Sensory: Intact in bilateral lower extremities [de-identified] : Constitutional\par o Appearance : well-nourished, well developed, alert, in no acute distress \par Head and Face\par o Head :\par ¦ Inspection : atraumatic, normocephalic\par Neurologic\par o Mental Status Examination :\par ¦ Orientation : alert and oriented X 3\par Psychiatric\par o Mood and Affect: mood normal, affect appropriate \par Cardiovascular\par o Observation/Palpation : - no swelling,normal pulses, normal temperature \par Lymphatic\par o Additional Nodes : No palpable lymph nodes present \par \par Cervical Spine\par o Musculoskeletal Examination : full flexion and rotation of the cervical spine. No paracervical tenderness.\par o Inspection/Palpation :\par ¦ Inspection : alignment midline, normal degree of lordosis present\par ¦ Skin : normal appearance, no masses or tenderness, trachea midline\par ¦ Palpation : musculature is nontender to palpation\par o Range of Motion : arc of motion full in all planes, no crepitance or pain with ROM\par \par \par Right Upper Extremity\par o Shoulder :\par ¦ Inspection/Palpation : no tenderness, swelling or deformities\par ¦ Range of Motion : full and painless in all planes, no crepitance\par ¦ Strength : forward elevation, internal rotation, external rotation, adduction and abduction 5/5\par ¦ Stability : no joint instability on provocative testing \par Tests: Anaya negative, Neer negative\par \par Left Upper Extremity\par o Shoulder :\par ¦ Inspection/Palpation : no tenderness, swelling or deformities\par ¦ Range of Motion : full and painless in all planes, no crepitance\par ¦ Strength : forward elevation, internal rotation, external rotation, adduction and abduction 5/5\par ¦ Stability : no joint instability on provocative testing\par  Tests: Anaya negative, Neer negative\par \par Gait: normal gait, no significant extremity swelling or lymphedema \par \par Radiology Results \par o Right Shoulder : AP internal and external and lateral views were obtained and revealed\par

## 2020-01-13 NOTE — REASON FOR VISIT
[Follow-Up Visit] : a follow-up visit for [Shoulder Pain] : shoulder pain [FreeTextEntry2] : right shoulder pain

## 2020-01-28 ENCOUNTER — APPOINTMENT (OUTPATIENT)
Dept: ALLERGY | Facility: CLINIC | Age: 70
End: 2020-01-28
Payer: MEDICARE

## 2020-01-28 VITALS
OXYGEN SATURATION: 96 % | RESPIRATION RATE: 14 BRPM | HEART RATE: 79 BPM | DIASTOLIC BLOOD PRESSURE: 70 MMHG | HEIGHT: 67 IN | SYSTOLIC BLOOD PRESSURE: 120 MMHG | BODY MASS INDEX: 41.59 KG/M2 | WEIGHT: 265 LBS

## 2020-01-28 PROCEDURE — 99214 OFFICE O/P EST MOD 30 MIN: CPT

## 2020-01-28 RX ORDER — AZELASTINE HYDROCHLORIDE 137 UG/1
0.1 SPRAY, METERED NASAL TWICE DAILY
Qty: 1 | Refills: 3 | Status: DISCONTINUED | COMMUNITY
Start: 2018-04-12 | End: 2020-01-28

## 2020-01-28 NOTE — PHYSICAL EXAM
[Alert] : alert [No Acute Distress] : no acute distress [Normal Lips/Tongue] : the lips and tongue were normal [Normal Tonsils] : normal tonsils [No Oral Lesions or Ulcers] : no oral lesions or ulcers [Boggy Nasal Turbinates] : boggy and/or pale nasal turbinates [Posterior Pharyngeal Cobblestoning] : posterior pharyngeal cobblestoning [No Neck Mass] : no neck mass was observed [No LAD] : no lymphadenopathy [No Thyroid Mass] : no thyroid mass [Supple] : the neck was supple [Normal Rate and Effort] : normal respiratory rhythm and effort [No Crackles] : no crackles [Wheezing] : no wheezing was heard [Normal Rate] : heart rate was normal  [Normal S1, S2] : normal S1 and S2 [Regular Rhythm] : with a regular rhythm [Normal Cervical Lymph Nodes] : cervical [No clubbing] : no clubbing [No Edema] : no edema [No Cyanosis] : no cyanosis [Normal Mood] : mood was normal [Normal Affect] : affect was normal [Judgment and Insight Age Appropriate] : judgement and insight is age appropriate [Alert, Awake, Oriented as Age-Appropriate] : alert, awake, oriented as age appropriate [de-identified] : obese male  [de-identified] : left sided mild lower rib discomfort with deep palpation  [de-identified] : no hives or swelling

## 2020-01-28 NOTE — ASSESSMENT
[FreeTextEntry1] : Chronic idiopathic urticaria/angioedema:\par \par Continue Allegra 180 mg QD \par Continue with Xolair at Q 1 month - if stable by the summer will try every 5 weeks again.

## 2020-01-28 NOTE — PHYSICAL EXAM
[Alert] : alert [No Acute Distress] : no acute distress [Normal Lips/Tongue] : the lips and tongue were normal [Normal Tonsils] : normal tonsils [No Oral Lesions or Ulcers] : no oral lesions or ulcers [Boggy Nasal Turbinates] : boggy and/or pale nasal turbinates [Posterior Pharyngeal Cobblestoning] : posterior pharyngeal cobblestoning [No Neck Mass] : no neck mass was observed [No LAD] : no lymphadenopathy [No Thyroid Mass] : no thyroid mass [Supple] : the neck was supple [Normal Rate and Effort] : normal respiratory rhythm and effort [No Crackles] : no crackles [Wheezing] : no wheezing was heard [Normal Rate] : heart rate was normal  [Normal S1, S2] : normal S1 and S2 [Regular Rhythm] : with a regular rhythm [Normal Cervical Lymph Nodes] : cervical [No clubbing] : no clubbing [No Edema] : no edema [No Cyanosis] : no cyanosis [Normal Mood] : mood was normal [Normal Affect] : affect was normal [Judgment and Insight Age Appropriate] : judgement and insight is age appropriate [Alert, Awake, Oriented as Age-Appropriate] : alert, awake, oriented as age appropriate [de-identified] : obese male  [de-identified] : left sided mild lower rib discomfort with deep palpation  [de-identified] : no hives or swelling

## 2020-01-28 NOTE — HISTORY OF PRESENT ILLNESS
[Asthma] : asthma [Eczematous rashes] : eczematous rashes [Venom Reactions] : venom reactions [Food Allergies] : food allergies [de-identified] : Patient had break thru symptoms at 5 weeks - patient tolerating ARB with no reaction.  Patient was treated with prednisone x 5 days at that time and he takes Allegra 180 mg QD.   His symptoms are better controlled at every 4 weeks.   HgA1C elevated at 7.2.     Patient working 2 days a week.

## 2020-01-28 NOTE — HISTORY OF PRESENT ILLNESS
[Asthma] : asthma [Eczematous rashes] : eczematous rashes [Venom Reactions] : venom reactions [Food Allergies] : food allergies [de-identified] : Patient had break thru symptoms at 5 weeks - patient tolerating ARB with no reaction.  Patient was treated with prednisone x 5 days at that time and he takes Allegra 180 mg QD.   His symptoms are better controlled at every 4 weeks.   HgA1C elevated at 7.2.     Patient working 2 days a week.

## 2020-01-28 NOTE — REASON FOR VISIT
[Routine Follow-Up] : a routine follow-up visit for [FreeTextEntry3] : recurrent hives and angioedema

## 2020-01-31 ENCOUNTER — OUTPATIENT (OUTPATIENT)
Dept: OUTPATIENT SERVICES | Facility: HOSPITAL | Age: 70
LOS: 1 days | End: 2020-01-31

## 2020-01-31 DIAGNOSIS — M19.011 PRIMARY OSTEOARTHRITIS, RIGHT SHOULDER: ICD-10-CM

## 2020-02-04 ENCOUNTER — APPOINTMENT (OUTPATIENT)
Dept: ALLERGY | Facility: CLINIC | Age: 70
End: 2020-02-04
Payer: MEDICARE

## 2020-02-04 VITALS — RESPIRATION RATE: 16 BRPM | DIASTOLIC BLOOD PRESSURE: 72 MMHG | SYSTOLIC BLOOD PRESSURE: 124 MMHG | HEART RATE: 77 BPM

## 2020-02-04 PROCEDURE — 96372 THER/PROPH/DIAG INJ SC/IM: CPT

## 2020-02-04 RX ORDER — OMALIZUMAB 202.5 MG/1.4ML
150 INJECTION, SOLUTION SUBCUTANEOUS
Qty: 1 | Refills: 0 | Status: COMPLETED | OUTPATIENT
Start: 2020-02-04

## 2020-02-04 RX ADMIN — OMALIZUMAB 0 MG: 202.5 INJECTION, SOLUTION SUBCUTANEOUS at 00:00

## 2020-02-22 ENCOUNTER — APPOINTMENT (OUTPATIENT)
Dept: MRI IMAGING | Facility: IMAGING CENTER | Age: 70
End: 2020-02-22
Payer: MEDICARE

## 2020-02-22 ENCOUNTER — OUTPATIENT (OUTPATIENT)
Dept: OUTPATIENT SERVICES | Facility: HOSPITAL | Age: 70
LOS: 1 days | End: 2020-02-22
Payer: MEDICARE

## 2020-02-22 DIAGNOSIS — M19.011 PRIMARY OSTEOARTHRITIS, RIGHT SHOULDER: ICD-10-CM

## 2020-02-22 PROCEDURE — 73221 MRI JOINT UPR EXTREM W/O DYE: CPT | Mod: 26,RT

## 2020-02-22 PROCEDURE — 73221 MRI JOINT UPR EXTREM W/O DYE: CPT

## 2020-02-27 ENCOUNTER — APPOINTMENT (OUTPATIENT)
Dept: ORTHOPEDIC SURGERY | Facility: CLINIC | Age: 70
End: 2020-02-27
Payer: MEDICARE

## 2020-02-27 DIAGNOSIS — M75.21 BICIPITAL TENDINITIS, RIGHT SHOULDER: ICD-10-CM

## 2020-02-27 PROCEDURE — 99214 OFFICE O/P EST MOD 30 MIN: CPT

## 2020-03-03 ENCOUNTER — APPOINTMENT (OUTPATIENT)
Dept: ALLERGY | Facility: CLINIC | Age: 70
End: 2020-03-03
Payer: MEDICARE

## 2020-03-03 VITALS — HEART RATE: 80 BPM | SYSTOLIC BLOOD PRESSURE: 142 MMHG | RESPIRATION RATE: 16 BRPM | DIASTOLIC BLOOD PRESSURE: 78 MMHG

## 2020-03-03 PROCEDURE — 99214 OFFICE O/P EST MOD 30 MIN: CPT | Mod: 25

## 2020-03-03 PROCEDURE — 96372 THER/PROPH/DIAG INJ SC/IM: CPT

## 2020-03-03 RX ORDER — OMALIZUMAB 202.5 MG/1.4ML
150 INJECTION, SOLUTION SUBCUTANEOUS
Qty: 1 | Refills: 0 | Status: COMPLETED | OUTPATIENT
Start: 2020-03-03

## 2020-03-03 RX ADMIN — OMALIZUMAB 0 MG: 202.5 INJECTION, SOLUTION SUBCUTANEOUS at 00:00

## 2020-03-03 NOTE — HISTORY OF PRESENT ILLNESS
[Allergic Rhinitis] : allergic rhinitis [Asthma] : asthma [Eczematous rashes] : eczematous rashes [Venom Reactions] : venom reactions [Food Allergies] : food allergies [de-identified] : Patient has been taking Ibuprofen an a regular basis for knee pain - he has had worsening of his hives and swelling of the lips and eyes since he started regular NSAIDs

## 2020-03-03 NOTE — ASSESSMENT
[FreeTextEntry1] : CIU/angioedema with acute exacerbation secondary to NSAID medical therapy:\par \par Avoid NSAID/ASA medical therapy\par He will discuss with PCP if he can be treated with Celebrex for knee pain \par Celebrex oral challenge

## 2020-03-03 NOTE — PHYSICAL EXAM
[Alert] : alert [Normal Lips/Tongue] : the lips and tongue were normal [No Acute Distress] : no acute distress [Normal Tonsils] : normal tonsils [Boggy Nasal Turbinates] : boggy and/or pale nasal turbinates [No Oral Lesions or Ulcers] : no oral lesions or ulcers [No Neck Mass] : no neck mass was observed [Posterior Pharyngeal Cobblestoning] : posterior pharyngeal cobblestoning [No LAD] : no lymphadenopathy [No Thyroid Mass] : no thyroid mass [Supple] : the neck was supple [Normal Rate and Effort] : normal respiratory rhythm and effort [Wheezing] : no wheezing was heard [No Crackles] : no crackles [Normal Rate] : heart rate was normal  [Normal S1, S2] : normal S1 and S2 [Regular Rhythm] : with a regular rhythm [Normal Cervical Lymph Nodes] : cervical [No clubbing] : no clubbing [No Edema] : no edema [No Cyanosis] : no cyanosis [Normal Mood] : mood was normal [Normal Affect] : affect was normal [Judgment and Insight Age Appropriate] : judgement and insight is age appropriate [Alert, Awake, Oriented as Age-Appropriate] : alert, awake, oriented as age appropriate [de-identified] : obese male  [de-identified] : angioedema of left eye and lower lip

## 2020-03-03 NOTE — REASON FOR VISIT
[Routine Follow-Up] : a routine follow-up visit for [FreeTextEntry3] : exacerbation of CIU/angioedema

## 2020-03-25 ENCOUNTER — APPOINTMENT (OUTPATIENT)
Dept: ORTHOPEDIC SURGERY | Facility: CLINIC | Age: 70
End: 2020-03-25

## 2020-03-25 ENCOUNTER — APPOINTMENT (OUTPATIENT)
Dept: ALLERGY | Facility: CLINIC | Age: 70
End: 2020-03-25
Payer: MEDICARE

## 2020-03-25 PROCEDURE — G2012 BRIEF CHECK IN BY MD/QHP: CPT

## 2020-03-31 ENCOUNTER — APPOINTMENT (OUTPATIENT)
Dept: ALLERGY | Facility: CLINIC | Age: 70
End: 2020-03-31
Payer: MEDICARE

## 2020-03-31 PROCEDURE — 96372 THER/PROPH/DIAG INJ SC/IM: CPT

## 2020-03-31 RX ORDER — OMALIZUMAB 202.5 MG/1.4ML
150 INJECTION, SOLUTION SUBCUTANEOUS
Qty: 1 | Refills: 0 | Status: COMPLETED | OUTPATIENT
Start: 2020-03-31

## 2020-03-31 RX ADMIN — OMALIZUMAB 0 MG: 202.5 INJECTION, SOLUTION SUBCUTANEOUS at 00:00

## 2020-04-20 ENCOUNTER — RX RENEWAL (OUTPATIENT)
Age: 70
End: 2020-04-20

## 2020-04-28 ENCOUNTER — APPOINTMENT (OUTPATIENT)
Dept: ALLERGY | Facility: CLINIC | Age: 70
End: 2020-04-28
Payer: MEDICARE

## 2020-04-28 PROCEDURE — 96372 THER/PROPH/DIAG INJ SC/IM: CPT

## 2020-04-28 RX ORDER — OMALIZUMAB 202.5 MG/1.4ML
150 INJECTION, SOLUTION SUBCUTANEOUS
Qty: 1 | Refills: 0 | Status: COMPLETED | OUTPATIENT
Start: 2020-04-28

## 2020-04-28 RX ADMIN — OMALIZUMAB 0 MG: 202.5 INJECTION, SOLUTION SUBCUTANEOUS at 00:00

## 2020-05-26 ENCOUNTER — APPOINTMENT (OUTPATIENT)
Dept: ALLERGY | Facility: CLINIC | Age: 70
End: 2020-05-26
Payer: MEDICARE

## 2020-05-26 VITALS
HEART RATE: 85 BPM | TEMPERATURE: 98.9 F | HEIGHT: 67 IN | DIASTOLIC BLOOD PRESSURE: 79 MMHG | RESPIRATION RATE: 16 BRPM | OXYGEN SATURATION: 97 % | SYSTOLIC BLOOD PRESSURE: 130 MMHG | WEIGHT: 270 LBS | BODY MASS INDEX: 42.38 KG/M2

## 2020-05-26 PROCEDURE — 96372 THER/PROPH/DIAG INJ SC/IM: CPT

## 2020-05-26 PROCEDURE — 99213 OFFICE O/P EST LOW 20 MIN: CPT | Mod: 25

## 2020-05-26 RX ORDER — OMALIZUMAB 150 MG/ML
150 INJECTION, SOLUTION SUBCUTANEOUS
Refills: 0 | Status: COMPLETED | OUTPATIENT
Start: 2020-05-26

## 2020-05-26 RX ADMIN — OMALIZUMAB 0 MG/ML: 150 INJECTION, SOLUTION SUBCUTANEOUS at 00:00

## 2020-05-26 NOTE — HISTORY OF PRESENT ILLNESS
[Asthma] : asthma [Allergic Rhinitis] : allergic rhinitis [Eczematous rashes] : eczematous rashes [Venom Reactions] : venom reactions [Food Allergies] : food allergies [de-identified] : Patient reports sporadic flares of hives and he has taken prednisone 20 mg with elevated glucose over 250 - he reports that his HgA1C level is also quite high but he does not recall the number.   He will take prednisone for 1 week when his symptoms become problematic.

## 2020-05-26 NOTE — ASSESSMENT
[FreeTextEntry1] : CIU\par Idiopathic angioedema\par Diabetes mellitus\par \par Allegra 180 mg QAM\par Hydroxyzine 50 mg mid day\par Zyrtec 20 mg QHS\par \par Patient advised for significant exacerbation to take prednisone 10 mg QD x 2 D than 5 mg QD x 2 D in order to lessen elevated blood glucose levels as side effect of prednisone. \par \par Patient administered Xolair 300 mg SQ today.

## 2020-05-26 NOTE — PHYSICAL EXAM
[Alert] : alert [Well Nourished] : well nourished [Healthy Appearance] : healthy appearance [No Acute Distress] : no acute distress [Well Developed] : well developed [Normal Voice/Communication] : normal voice communication [Normal Rate] : heart rate was normal  [Normal S1, S2] : normal S1 and S2 [No Rash] : no rash [Normal Mood] : mood was normal [Normal Affect] : affect was normal [Alert, Awake, Oriented as Age-Appropriate] : alert, awake, oriented as age appropriate [Judgment and Insight Age Appropriate] : judgement and insight is age appropriate [Wheezing] : no wheezing was heard

## 2020-06-23 ENCOUNTER — APPOINTMENT (OUTPATIENT)
Dept: ALLERGY | Facility: CLINIC | Age: 70
End: 2020-06-23
Payer: MEDICARE

## 2020-06-23 VITALS
DIASTOLIC BLOOD PRESSURE: 74 MMHG | HEART RATE: 83 BPM | SYSTOLIC BLOOD PRESSURE: 136 MMHG | OXYGEN SATURATION: 95 % | RESPIRATION RATE: 18 BRPM

## 2020-06-23 PROCEDURE — 96372 THER/PROPH/DIAG INJ SC/IM: CPT

## 2020-06-23 RX ORDER — OMALIZUMAB 202.5 MG/1.4ML
150 INJECTION, SOLUTION SUBCUTANEOUS
Qty: 1 | Refills: 0 | Status: COMPLETED | OUTPATIENT
Start: 2020-06-23

## 2020-06-23 RX ADMIN — OMALIZUMAB 0 MG: 202.5 INJECTION, SOLUTION SUBCUTANEOUS at 00:00

## 2020-07-21 ENCOUNTER — APPOINTMENT (OUTPATIENT)
Dept: ALLERGY | Facility: CLINIC | Age: 70
End: 2020-07-21
Payer: MEDICARE

## 2020-07-21 VITALS
TEMPERATURE: 97.7 F | OXYGEN SATURATION: 97 % | HEART RATE: 86 BPM | SYSTOLIC BLOOD PRESSURE: 140 MMHG | RESPIRATION RATE: 18 BRPM | DIASTOLIC BLOOD PRESSURE: 80 MMHG

## 2020-07-21 PROCEDURE — 96372 THER/PROPH/DIAG INJ SC/IM: CPT

## 2020-07-21 RX ORDER — OMALIZUMAB 150 MG/ML
150 INJECTION, SOLUTION SUBCUTANEOUS
Refills: 0 | Status: COMPLETED | OUTPATIENT
Start: 2020-07-21

## 2020-07-28 ENCOUNTER — APPOINTMENT (OUTPATIENT)
Dept: ALLERGY | Facility: CLINIC | Age: 70
End: 2020-07-28
Payer: MEDICARE

## 2020-07-28 VITALS
WEIGHT: 272 LBS | HEIGHT: 67 IN | DIASTOLIC BLOOD PRESSURE: 82 MMHG | BODY MASS INDEX: 42.69 KG/M2 | RESPIRATION RATE: 16 BRPM | SYSTOLIC BLOOD PRESSURE: 130 MMHG | HEART RATE: 85 BPM | OXYGEN SATURATION: 96 % | TEMPERATURE: 98 F

## 2020-07-28 PROCEDURE — 99214 OFFICE O/P EST MOD 30 MIN: CPT

## 2020-07-28 NOTE — PHYSICAL EXAM
[Alert] : alert [Healthy Appearance] : healthy appearance [Well Nourished] : well nourished [No Acute Distress] : no acute distress [Normal Voice/Communication] : normal voice communication [Well Developed] : well developed [Wheezing] : no wheezing was heard [Normal Rate] : heart rate was normal  [No Rash] : no rash [Normal S1, S2] : normal S1 and S2 [Normal Affect] : affect was normal [Normal Mood] : mood was normal [Judgment and Insight Age Appropriate] : judgement and insight is age appropriate [Alert, Awake, Oriented as Age-Appropriate] : alert, awake, oriented as age appropriate

## 2020-07-28 NOTE — REVIEW OF SYSTEMS
[Nl] : Genitourinary [FreeTextEntry9] : back pain  [de-identified] : HgA1C elevated to over 8  [de-identified] : see above

## 2020-07-28 NOTE — HISTORY OF PRESENT ILLNESS
[de-identified] : About 10 days he needed prednisone x 3 days - 10 mg x 1 D and 5 mg x 2 D.    Taking Allegra in the AM - hydroxyzine 50 mg mid day and Xyzal 5 mg QHS.

## 2020-07-28 NOTE — ASSESSMENT
[FreeTextEntry1] : CIU/angioedema:\par \par Reinforced the need to minimize prednisone during exacerbation of symptoms - prednisone 5 mg QD x 3 D\par Continue present antihistamine regimen\par Continue Xolair Q 4 weeks

## 2020-08-03 ENCOUNTER — APPOINTMENT (OUTPATIENT)
Dept: ORTHOPEDIC SURGERY | Facility: CLINIC | Age: 70
End: 2020-08-03
Payer: MEDICARE

## 2020-08-03 PROCEDURE — 99214 OFFICE O/P EST MOD 30 MIN: CPT

## 2020-08-18 ENCOUNTER — APPOINTMENT (OUTPATIENT)
Dept: ALLERGY | Facility: CLINIC | Age: 70
End: 2020-08-18
Payer: MEDICARE

## 2020-08-18 VITALS
DIASTOLIC BLOOD PRESSURE: 82 MMHG | TEMPERATURE: 97.4 F | HEART RATE: 71 BPM | SYSTOLIC BLOOD PRESSURE: 120 MMHG | RESPIRATION RATE: 18 BRPM | OXYGEN SATURATION: 98 %

## 2020-08-18 PROCEDURE — 96372 THER/PROPH/DIAG INJ SC/IM: CPT

## 2020-08-18 RX ORDER — OMALIZUMAB 150 MG/ML
150 INJECTION, SOLUTION SUBCUTANEOUS
Refills: 0 | Status: COMPLETED | OUTPATIENT
Start: 2020-08-18

## 2020-08-18 RX ADMIN — OMALIZUMAB 0 MG/ML: 150 INJECTION, SOLUTION SUBCUTANEOUS at 00:00

## 2020-09-03 ENCOUNTER — APPOINTMENT (OUTPATIENT)
Dept: ORTHOPEDIC SURGERY | Facility: CLINIC | Age: 70
End: 2020-09-03
Payer: MEDICARE

## 2020-09-03 PROCEDURE — 99214 OFFICE O/P EST MOD 30 MIN: CPT

## 2020-09-14 ENCOUNTER — RX RENEWAL (OUTPATIENT)
Age: 70
End: 2020-09-14

## 2020-09-22 ENCOUNTER — APPOINTMENT (OUTPATIENT)
Dept: ALLERGY | Facility: CLINIC | Age: 70
End: 2020-09-22
Payer: MEDICARE

## 2020-09-22 VITALS
DIASTOLIC BLOOD PRESSURE: 80 MMHG | HEART RATE: 82 BPM | RESPIRATION RATE: 16 BRPM | TEMPERATURE: 97.9 F | SYSTOLIC BLOOD PRESSURE: 150 MMHG | OXYGEN SATURATION: 97 %

## 2020-09-22 PROCEDURE — 96372 THER/PROPH/DIAG INJ SC/IM: CPT

## 2020-09-22 RX ORDER — OMALIZUMAB 150 MG/ML
150 INJECTION, SOLUTION SUBCUTANEOUS
Refills: 0 | Status: COMPLETED | OUTPATIENT
Start: 2020-09-22

## 2020-10-07 ENCOUNTER — APPOINTMENT (OUTPATIENT)
Dept: ORTHOPEDIC SURGERY | Facility: CLINIC | Age: 70
End: 2020-10-07
Payer: MEDICARE

## 2020-10-07 DIAGNOSIS — M89.8X8 OTHER SPECIFIED DISORDERS OF BONE, OTHER SITE: ICD-10-CM

## 2020-10-07 PROCEDURE — 99214 OFFICE O/P EST MOD 30 MIN: CPT

## 2020-10-08 ENCOUNTER — OUTPATIENT (OUTPATIENT)
Dept: OUTPATIENT SERVICES | Facility: HOSPITAL | Age: 70
LOS: 1 days | End: 2020-10-08
Payer: MEDICARE

## 2020-10-08 ENCOUNTER — RESULT REVIEW (OUTPATIENT)
Age: 70
End: 2020-10-08

## 2020-10-08 ENCOUNTER — APPOINTMENT (OUTPATIENT)
Dept: CT IMAGING | Facility: IMAGING CENTER | Age: 70
End: 2020-10-08
Payer: MEDICARE

## 2020-10-08 DIAGNOSIS — M48.061 SPINAL STENOSIS, LUMBAR REGION WITHOUT NEUROGENIC CLAUDICATION: ICD-10-CM

## 2020-10-08 DIAGNOSIS — M47.816 SPONDYLOSIS WITHOUT MYELOPATHY OR RADICULOPATHY, LUMBAR REGION: ICD-10-CM

## 2020-10-08 PROCEDURE — 72132 CT LUMBAR SPINE W/DYE: CPT | Mod: 26

## 2020-10-08 PROCEDURE — 82565 ASSAY OF CREATININE: CPT

## 2020-10-08 PROCEDURE — 72132 CT LUMBAR SPINE W/DYE: CPT

## 2020-10-21 ENCOUNTER — APPOINTMENT (OUTPATIENT)
Dept: ALLERGY | Facility: CLINIC | Age: 70
End: 2020-10-21
Payer: MEDICARE

## 2020-10-21 VITALS
SYSTOLIC BLOOD PRESSURE: 155 MMHG | DIASTOLIC BLOOD PRESSURE: 80 MMHG | TEMPERATURE: 97.3 F | HEART RATE: 84 BPM | OXYGEN SATURATION: 97 % | RESPIRATION RATE: 16 BRPM

## 2020-10-21 PROCEDURE — 96372 THER/PROPH/DIAG INJ SC/IM: CPT

## 2020-10-21 RX ORDER — OMALIZUMAB 150 MG/ML
150 INJECTION, SOLUTION SUBCUTANEOUS
Qty: 0 | Refills: 0 | Status: COMPLETED | OUTPATIENT
Start: 2020-10-20

## 2020-10-22 ENCOUNTER — OUTPATIENT (OUTPATIENT)
Dept: OUTPATIENT SERVICES | Facility: HOSPITAL | Age: 70
LOS: 1 days | End: 2020-10-22
Payer: MEDICARE

## 2020-10-22 ENCOUNTER — APPOINTMENT (OUTPATIENT)
Dept: MRI IMAGING | Facility: CLINIC | Age: 70
End: 2020-10-22
Payer: MEDICARE

## 2020-10-22 DIAGNOSIS — M47.816 SPONDYLOSIS WITHOUT MYELOPATHY OR RADICULOPATHY, LUMBAR REGION: ICD-10-CM

## 2020-10-22 DIAGNOSIS — M48.061 SPINAL STENOSIS, LUMBAR REGION WITHOUT NEUROGENIC CLAUDICATION: ICD-10-CM

## 2020-10-22 PROCEDURE — 72158 MRI LUMBAR SPINE W/O & W/DYE: CPT

## 2020-10-22 PROCEDURE — A9585: CPT

## 2020-10-22 PROCEDURE — 72158 MRI LUMBAR SPINE W/O & W/DYE: CPT | Mod: 26

## 2020-11-30 ENCOUNTER — APPOINTMENT (OUTPATIENT)
Dept: ALLERGY | Facility: CLINIC | Age: 70
End: 2020-11-30
Payer: MEDICARE

## 2020-11-30 VITALS
OXYGEN SATURATION: 98 % | RESPIRATION RATE: 16 BRPM | DIASTOLIC BLOOD PRESSURE: 72 MMHG | HEART RATE: 73 BPM | SYSTOLIC BLOOD PRESSURE: 140 MMHG | TEMPERATURE: 98 F

## 2020-11-30 PROCEDURE — 99214 OFFICE O/P EST MOD 30 MIN: CPT | Mod: 25

## 2020-11-30 PROCEDURE — 96372 THER/PROPH/DIAG INJ SC/IM: CPT

## 2020-11-30 RX ORDER — OMALIZUMAB 150 MG/ML
150 INJECTION, SOLUTION SUBCUTANEOUS
Qty: 0 | Refills: 0 | Status: COMPLETED | OUTPATIENT
Start: 2020-11-30

## 2020-11-30 NOTE — HISTORY OF PRESENT ILLNESS
[de-identified] : Patient diagnosed with Hodgkin's lymphoma 2 weeks ago - mass on back - PET scan revealed other sites - stage 4 disease - he will be starting chemotherapy next week - every other week x 15 treatments.   He took prednisone 10 mg QD x 5 days because he is late for his Xolair.

## 2020-11-30 NOTE — ASSESSMENT
[FreeTextEntry1] : Chronic urticaria/angioedema well controlled on Q4 week Xolair.   Patient with recent diagnosis of Hodgkin's lymphoma stage 4 requiring chemotherapy.   I have advised patient to let me know how he is tolerating treatment - he will be receiving steroids as part of his chemotherapy and therefore his urticaria/angioedema should be controlled but would preferably desire to continue with Xolair.   If patient is significantly immunosuppressed would prefer that he not leave his house if there are COVID restrictions in 1 month.

## 2020-11-30 NOTE — REVIEW OF SYSTEMS
[Nl] : Genitourinary [FreeTextEntry9] : back pain - see recent diagnosis [de-identified] : see above  [de-identified] : HgA1C elevated to over 8

## 2020-11-30 NOTE — PHYSICAL EXAM
[Alert] : alert [Well Nourished] : well nourished [Healthy Appearance] : healthy appearance [No Acute Distress] : no acute distress [Well Developed] : well developed [Normal Voice/Communication] : normal voice communication [Wheezing] : no wheezing was heard [Normal Rate] : heart rate was normal  [Normal S1, S2] : normal S1 and S2 [No Rash] : no rash [Normal Mood] : mood was normal [Normal Affect] : affect was normal [Judgment and Insight Age Appropriate] : judgement and insight is age appropriate [Alert, Awake, Oriented as Age-Appropriate] : alert, awake, oriented as age appropriate

## 2021-01-04 ENCOUNTER — APPOINTMENT (OUTPATIENT)
Dept: ALLERGY | Facility: CLINIC | Age: 71
End: 2021-01-04
Payer: MEDICARE

## 2021-01-04 VITALS
SYSTOLIC BLOOD PRESSURE: 130 MMHG | OXYGEN SATURATION: 98 % | TEMPERATURE: 98 F | RESPIRATION RATE: 14 BRPM | HEART RATE: 73 BPM | DIASTOLIC BLOOD PRESSURE: 70 MMHG

## 2021-01-04 PROCEDURE — 96372 THER/PROPH/DIAG INJ SC/IM: CPT

## 2021-01-04 RX ORDER — OMALIZUMAB 150 MG/ML
150 INJECTION, SOLUTION SUBCUTANEOUS
Qty: 0 | Refills: 0 | Status: COMPLETED | OUTPATIENT
Start: 2021-01-04

## 2021-01-13 ENCOUNTER — APPOINTMENT (OUTPATIENT)
Dept: ALLERGY | Facility: CLINIC | Age: 71
End: 2021-01-13
Payer: MEDICARE

## 2021-01-13 VITALS
DIASTOLIC BLOOD PRESSURE: 78 MMHG | OXYGEN SATURATION: 97 % | SYSTOLIC BLOOD PRESSURE: 130 MMHG | TEMPERATURE: 98.2 F | RESPIRATION RATE: 16 BRPM | HEART RATE: 86 BPM

## 2021-01-13 PROCEDURE — 99214 OFFICE O/P EST MOD 30 MIN: CPT

## 2021-01-13 RX ORDER — BETAMETHASONE DIPROPIONATE 0.5 MG/G
0.05 CREAM, AUGMENTED TOPICAL TWICE DAILY
Qty: 80 | Refills: 0 | Status: ACTIVE | COMMUNITY
Start: 2021-01-13 | End: 1900-01-01

## 2021-01-13 NOTE — PHYSICAL EXAM
[Alert] : alert [Well Nourished] : well nourished [Healthy Appearance] : healthy appearance [No Acute Distress] : no acute distress [Well Developed] : well developed [Normal Voice/Communication] : normal voice communication [No Neck Mass] : no neck mass was observed [No LAD] : no lymphadenopathy [No Thyroid Mass] : no thyroid mass [Normal Rate and Effort] : normal respiratory rhythm and effort [No Crackles] : no crackles [No Retractions] : no retractions [Normal Rate] : heart rate was normal  [Normal S1, S2] : normal S1 and S2 [No murmur] : no murmur [Regular Rhythm] : with a regular rhythm [Patches] : ~M patches present [Erythema] : erythema [Normal Mood] : mood was normal [Normal Affect] : affect was normal [Judgment and Insight Age Appropriate] : judgement and insight is age appropriate [Alert, Awake, Oriented as Age-Appropriate] : alert, awake, oriented as age appropriate [Wheezing] : no wheezing was heard [de-identified] : small blisters on fingers

## 2021-01-13 NOTE — ASSESSMENT
[FreeTextEntry1] : Idiopathic urticaria/angioedema:\par \par Change Xolair to every 5 weeks - he is getting IV steroids for chemotherapy therefore he should be able to tolerate extending the interval of his injections. \par \par Dyshidrotic eczema:\par \par Diprolene cream BID prn \par \par

## 2021-01-13 NOTE — REASON FOR VISIT
[Routine Follow-Up] : a routine follow-up visit for [FreeTextEntry3] : follow up of urticaria/angioedema

## 2021-01-13 NOTE — REVIEW OF SYSTEMS
[Nl] : Genitourinary [FreeTextEntry9] : back pain - see recent diagnosis [de-identified] : see above  [de-identified] : HgA1C elevated to over 8

## 2021-01-13 NOTE — HISTORY OF PRESENT ILLNESS
[Asthma] : asthma [Allergic Rhinitis] : allergic rhinitis [Eczematous rashes] : eczematous rashes [Venom Reactions] : venom reactions [Food Allergies] : food allergies [de-identified] : Patient has started treatment for Hodgkin's lymphoma - he is being treated with every other week ABVD - premedicated with high dose steroids prior to the infusion.  Adriamcyin - Rubex - Bleomycin - Vinblastine - he will be having a PET scan in follow up.   Patient has been doing well with hives and swelling - he is taking Xolair every 4 weeks.   He has noted mild blisters and scaling on his fingers - history of eczema.

## 2021-02-11 ENCOUNTER — APPOINTMENT (OUTPATIENT)
Dept: ALLERGY | Facility: CLINIC | Age: 71
End: 2021-02-11
Payer: MEDICARE

## 2021-02-11 VITALS
TEMPERATURE: 98.6 F | DIASTOLIC BLOOD PRESSURE: 70 MMHG | OXYGEN SATURATION: 97 % | SYSTOLIC BLOOD PRESSURE: 130 MMHG | RESPIRATION RATE: 16 BRPM | HEART RATE: 91 BPM

## 2021-02-11 PROCEDURE — 96372 THER/PROPH/DIAG INJ SC/IM: CPT

## 2021-02-11 RX ORDER — OMALIZUMAB 150 MG/ML
150 INJECTION, SOLUTION SUBCUTANEOUS
Qty: 0 | Refills: 0 | Status: COMPLETED | OUTPATIENT
Start: 2021-02-09

## 2021-03-16 ENCOUNTER — NON-APPOINTMENT (OUTPATIENT)
Age: 71
End: 2021-03-16

## 2021-03-18 ENCOUNTER — APPOINTMENT (OUTPATIENT)
Dept: ALLERGY | Facility: CLINIC | Age: 71
End: 2021-03-18
Payer: MEDICARE

## 2021-03-18 VITALS
OXYGEN SATURATION: 98 % | RESPIRATION RATE: 14 BRPM | SYSTOLIC BLOOD PRESSURE: 128 MMHG | BODY MASS INDEX: 40.1 KG/M2 | WEIGHT: 256 LBS | DIASTOLIC BLOOD PRESSURE: 62 MMHG | HEART RATE: 91 BPM

## 2021-03-18 PROCEDURE — 96372 THER/PROPH/DIAG INJ SC/IM: CPT

## 2021-03-18 RX ORDER — OMALIZUMAB 150 MG/ML
150 INJECTION, SOLUTION SUBCUTANEOUS
Qty: 0 | Refills: 0 | Status: COMPLETED | OUTPATIENT
Start: 2021-03-18

## 2021-04-20 ENCOUNTER — APPOINTMENT (OUTPATIENT)
Dept: ALLERGY | Facility: CLINIC | Age: 71
End: 2021-04-20
Payer: MEDICARE

## 2021-04-20 VITALS
OXYGEN SATURATION: 98 % | HEART RATE: 88 BPM | TEMPERATURE: 98.1 F | RESPIRATION RATE: 16 BRPM | DIASTOLIC BLOOD PRESSURE: 72 MMHG | SYSTOLIC BLOOD PRESSURE: 110 MMHG

## 2021-04-20 PROCEDURE — 99212 OFFICE O/P EST SF 10 MIN: CPT | Mod: 25

## 2021-04-20 PROCEDURE — 96372 THER/PROPH/DIAG INJ SC/IM: CPT

## 2021-04-20 RX ORDER — OMALIZUMAB 150 MG/ML
150 INJECTION, SOLUTION SUBCUTANEOUS
Qty: 0 | Refills: 0 | Status: COMPLETED | OUTPATIENT
Start: 2021-04-20

## 2021-04-20 NOTE — ASSESSMENT
[FreeTextEntry1] : Dyshidrotic eczema:\par \par Patient was advised to used Diprolene cream BID on a regular basis to hands in order to resolve rash. \par RV prn symptoms\par \par Angioedema/urticaria under excellent control.

## 2021-04-20 NOTE — PHYSICAL EXAM
[Alert] : alert [Well Nourished] : well nourished [Healthy Appearance] : healthy appearance [No Acute Distress] : no acute distress [Well Developed] : well developed [Normal Voice/Communication] : normal voice communication [Patches] : ~M patches present [de-identified] : scattered eczematous patches on hands

## 2021-04-20 NOTE — HISTORY OF PRESENT ILLNESS
[de-identified] : Patient with persistent rash on his hands - he is using the Diprolene sporadically only.   He is tolerating his chemotherapy and has two more treatments.

## 2021-05-25 ENCOUNTER — APPOINTMENT (OUTPATIENT)
Dept: ALLERGY | Facility: CLINIC | Age: 71
End: 2021-05-25
Payer: MEDICARE

## 2021-05-25 VITALS
HEART RATE: 75 BPM | SYSTOLIC BLOOD PRESSURE: 126 MMHG | RESPIRATION RATE: 16 BRPM | TEMPERATURE: 97.7 F | DIASTOLIC BLOOD PRESSURE: 70 MMHG | OXYGEN SATURATION: 97 %

## 2021-05-25 PROCEDURE — 96372 THER/PROPH/DIAG INJ SC/IM: CPT

## 2021-05-25 RX ORDER — OMALIZUMAB 150 MG/ML
150 INJECTION, SOLUTION SUBCUTANEOUS
Qty: 0 | Refills: 0 | Status: COMPLETED | OUTPATIENT
Start: 2021-05-25

## 2021-06-11 ENCOUNTER — TRANSCRIPTION ENCOUNTER (OUTPATIENT)
Age: 71
End: 2021-06-11

## 2021-06-25 ENCOUNTER — APPOINTMENT (OUTPATIENT)
Dept: ORTHOPEDIC SURGERY | Facility: CLINIC | Age: 71
End: 2021-06-25
Payer: MEDICARE

## 2021-06-25 VITALS — WEIGHT: 251 LBS | HEIGHT: 67 IN | BODY MASS INDEX: 39.39 KG/M2

## 2021-06-25 DIAGNOSIS — S80.00XA CONTUSION OF UNSPECIFIED KNEE, INITIAL ENCOUNTER: ICD-10-CM

## 2021-06-25 DIAGNOSIS — S40.011A CONTUSION OF RIGHT SHOULDER, INITIAL ENCOUNTER: ICD-10-CM

## 2021-06-25 PROCEDURE — 99214 OFFICE O/P EST MOD 30 MIN: CPT

## 2021-06-25 PROCEDURE — 73030 X-RAY EXAM OF SHOULDER: CPT | Mod: RT

## 2021-06-25 NOTE — ADDENDUM
[FreeTextEntry1] : Documented by Mingo Araujo acting as a scribe for Dr. Hardy Henning (06/25/2021).\par \par All medical record entries made by the Scribe were at my, Dr. Hardy Henning, direction and personally dictated by me on (06/25/2021). I have reviewed the chart and agree that the record accurately reflects my personal performance of the history, physical exam, assessment and plan. I have also personally directed, reviewed, and agree with the discharge instructions.\par

## 2021-06-25 NOTE — DISCUSSION/SUMMARY
[de-identified] : I discussed the underlying pathophysiology of the patient's condition in great detail with the patient. I went over the patient's x-rays with them in great detail. We discussed the use of ice, Tylenol and anti-inflammatories to relieve pain. At-home strengthening exercises were discussed and demonstrated with the patient. Avoid going up and down stairs until pain has resolved. At this time, he will start a course of physical therapy for strengthening and flexibility. A prescription for physical therapy was provided. \par \par FU in 4-6 weeks\par after starting physical therapy for his shoulder

## 2021-06-25 NOTE — HISTORY OF PRESENT ILLNESS
[de-identified] : 71 year old male presents with right shoulder pain. He notes finishing 12 rounds of chemotherapy and had a PET CT which was negative he is being treated for Hodgkin's lymphoma and has an infusion port present.. He notes falling on his shoulder 6/17/2021. He notes wanting to start physical therapy for his shoulder. He notes feeling fatigued overall. Of note: He has a hx of DM on Glipizide and Metformin. He notes that his kidney function is normal.\par \par MRI of the Lumbar Spine done at [facility] on 9/24/2020 revealed:\par 1. A mass measuring 3.8 x 3.6 x 3.0 cm is identified centered in the mid left side of the L3 vertebral body that extends posteriorly into the spinal canal causing spinal canal stenosis that is of indeterminate etiology. The images were reviewed today and it appears to be an expansile lesion of L3.\par 2. Subcentimeter lesion lateral superior right side of the sacrum noted, that is of indeterminate etiology.\par 3. At the L2-3, L3-4 and L4-5 levels, a broad-based disc bulge is noted that encroaches the right and left neural foramina.\par 4. At L5-S1 level, grade 1 anterior spondylolisthesis of L5 on S1 and a broad-based disc bulge is noted.

## 2021-06-25 NOTE — PHYSICAL EXAM
[LE] : Sensory: Intact in bilateral lower extremities [de-identified] : Constitutional\par o Appearance : well-nourished, well developed, alert, in no acute distress \par Head and Face\par o Head :\par ¦ Inspection : atraumatic, normocephalic\par o Face :\par ¦ Inspection : no visible rash or discoloration\par Respiratory\par o Respiratory Effort: breathing unlabored \par Neurologic\par o Mental Status Examination :\par ¦ Orientation : alert and oriented X 3\par Psychiatric\par o Mood and Affect: mood normal, affect appropriate\par Cardiovascular\par o Observation/Palpation : - no swelling\par Lymphatic\par o Additional Nodes : No palpable lymph nodes present\par \par Lumbosacral Spine\par o Inspection : no visible rash or discoloration\par o Palpation : moderate tenderness over the L5/S1 area, no SI joint tenderness\par o Range of Motion : extension causes right and left SI joint pain, sidebending causes discomfort, rotation to the right causes right SI joint and paraspinal discomfort\par o Muscle Strength : paraspinal muscle strength and tone within normal limits\par o Muscle Tone : paraspinal muscle strength and tone within normal limits\par Tests: straight leg test negative and EM test negative bilaterally\par  \par Right Lower Extremity\par o Buttock : no tenderness, swelling or deformities\par o Right Hip :\par ¦ Inspection/Palpation : no tenderness, no swelling or deformities\par ¦ Range of Motion : full and painless in all planes, no crepitance\par ¦ Stability : joint stability intact\par ¦ Strength : extension, flexion, adduction, abduction, internal rotation and external rotation, 5/5\par Tests: Samina’s test negative\par \par o Knee right :\par ¦ Inspection/Palpation : no tenderness to palpation, no swelling\par ¦ Range of Motion : active flexion and extension full and painless, no crepitance\par ¦ Stability : no valgus or varus instability present on provocative testing\par ¦ Strength : flexion and extension 5/5\par ¦ Tests and Signs : negative Anterior Drawer, negative Lachman, negative Cassie's test \par \par \par o Left Knee :\par ¦ Inspection/Palpation : no medial or lateral compartment tenderness to palpation, no swelling, ecchymosis anterior and medially\par ¦ Range of Motion : active flexion and extension full and painless, no crepitance\par ¦ Stability : no valgus or varus instability present on provocative testing\par ¦ Strength : flexion and extension 5/5\par ¦ Tests and Signs : negative Anterior Drawer, negative Lachman, negative Cassie's test \par \par Left Lower Extremity\par o Buttock : no tenderness, swelling or deformities \par o Left Hip :\par ¦ Inspection/Palpation : no tenderness, no swelling or deformities\par ¦ Range of Motion : full flexion with limited rotation, no crepitance\par ¦ Stability : joint stability intact\par ¦ Strength : extension, flexion, adduction, abduction, internal rotation and external rotation, 5/5\par  Tests: Samina’s test negative\par \par o Shoulder :\par ¦ Inspection/Palpation : no tenderness, swelling or deformities mild anterior capsular tenderness\par ¦ Range of Motion : full forward flexion no crepitance\par ¦ Strength : forward elevation, internal rotation, external rotation, adduction and abduction, supraspinatus, external rotation at 90° of abduction, biceps/triceps, 5/5\par ¦ Stability : no joint instability on provocative testing \par ¦ Tests: Anaya negative, Neer negative, Kai negative, negative drop arm test \par \par Gait: kyphotic, no significant extremity swelling or lymphedema, good proprioception and balance, no foot drop, poor core strength, limited core strength- can't do a sit up at all\par Right shoulder x-ray: AP internal/external and outlet view revealed 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  There is no evident fracture.  A port is visible in his right chest

## 2021-06-29 ENCOUNTER — APPOINTMENT (OUTPATIENT)
Dept: ALLERGY | Facility: CLINIC | Age: 71
End: 2021-06-29
Payer: MEDICARE

## 2021-06-29 VITALS
BODY MASS INDEX: 39.31 KG/M2 | RESPIRATION RATE: 16 BRPM | WEIGHT: 251 LBS | OXYGEN SATURATION: 98 % | TEMPERATURE: 98.2 F | HEART RATE: 74 BPM | DIASTOLIC BLOOD PRESSURE: 78 MMHG | SYSTOLIC BLOOD PRESSURE: 130 MMHG

## 2021-06-29 PROCEDURE — 96372 THER/PROPH/DIAG INJ SC/IM: CPT

## 2021-06-29 RX ORDER — OMALIZUMAB 150 MG/ML
150 INJECTION, SOLUTION SUBCUTANEOUS
Refills: 0 | Status: COMPLETED | OUTPATIENT
Start: 2021-06-29

## 2021-06-29 RX ADMIN — OMALIZUMAB 0 MG/ML: 150 INJECTION, SOLUTION SUBCUTANEOUS at 00:00

## 2021-07-20 ENCOUNTER — APPOINTMENT (OUTPATIENT)
Dept: ALLERGY | Facility: CLINIC | Age: 71
End: 2021-07-20
Payer: MEDICARE

## 2021-07-20 VITALS
WEIGHT: 251 LBS | SYSTOLIC BLOOD PRESSURE: 130 MMHG | DIASTOLIC BLOOD PRESSURE: 70 MMHG | OXYGEN SATURATION: 97 % | HEIGHT: 67 IN | TEMPERATURE: 98.1 F | BODY MASS INDEX: 39.39 KG/M2 | HEART RATE: 77 BPM

## 2021-07-20 PROCEDURE — 99214 OFFICE O/P EST MOD 30 MIN: CPT

## 2021-07-20 RX ORDER — CELECOXIB 100 MG/1
100 CAPSULE ORAL TWICE DAILY
Qty: 180 | Refills: 0 | Status: DISCONTINUED | COMMUNITY
Start: 2020-04-20 | End: 2021-07-20

## 2021-07-20 NOTE — PHYSICAL EXAM
[Alert] : alert [Well Nourished] : well nourished [Healthy Appearance] : healthy appearance [No Acute Distress] : no acute distress [Well Developed] : well developed [Normal Voice/Communication] : normal voice communication [No Neck Mass] : no neck mass was observed [No LAD] : no lymphadenopathy [Normal Rate and Effort] : normal respiratory rhythm and effort [No Crackles] : no crackles [No Retractions] : no retractions [Bilateral Audible Breath Sounds] : bilateral audible breath sounds [Wheezing] : no wheezing was heard [Normal Rate] : heart rate was normal  [Normal S1, S2] : normal S1 and S2 [No murmur] : no murmur [Regular Rhythm] : with a regular rhythm [Normal Cervical Lymph Nodes] : cervical [Skin Intact] : skin intact  [No Rash] : no rash [No clubbing] : no clubbing [No Edema] : no edema [No Cyanosis] : no cyanosis [Normal Mood] : mood was normal [Normal Affect] : affect was normal [Judgment and Insight Age Appropriate] : judgement and insight is age appropriate [Alert, Awake, Oriented as Age-Appropriate] : alert, awake, oriented as age appropriate

## 2021-07-20 NOTE — REVIEW OF SYSTEMS
[Nl] : Genitourinary [FreeTextEntry9] : back pain - see recent diagnosis [de-identified] : see above  [de-identified] : does not recall recent HgA1C

## 2021-07-20 NOTE — ASSESSMENT
[FreeTextEntry1] : CIU/angioedema:\par \par Will try to extend to every 5 weeks with Xolair \par Continue Allegra 180 mg QAM\par Continue Xyzal 5 mg QHS\par Add hydroxyzine 25 mg QID prn

## 2021-07-20 NOTE — HISTORY OF PRESENT ILLNESS
[de-identified] : Patient is taking Xolair every 4-5 weeks - he had some mild itching with extension to 5 weeks - he takes Allegra 180 mg QAM and levocetirizine at bedtime - he has not taken prednisone - completed chemotherapy end of May - normal PET scan.

## 2021-07-26 ENCOUNTER — APPOINTMENT (OUTPATIENT)
Dept: ORTHOPEDIC SURGERY | Facility: CLINIC | Age: 71
End: 2021-07-26
Payer: MEDICARE

## 2021-07-26 DIAGNOSIS — M19.011 PRIMARY OSTEOARTHRITIS, RIGHT SHOULDER: ICD-10-CM

## 2021-07-26 PROCEDURE — 99214 OFFICE O/P EST MOD 30 MIN: CPT

## 2021-07-26 NOTE — HISTORY OF PRESENT ILLNESS
[de-identified] : 71 year old male presents with right shoulder pain. He is going to PT for his shoulder and feels he is 85-90% better. He is also complaining about low back pain. He is being treated for Hodgkin's lymphoma in his back and completed 12 rounds of chemotherapy. He then had a PET CT which was negative. He would like to start PT for his back, but is concerned due to his lymphoma. Pmhx: He has a hx of DM on Glipizide and Metformin. 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 A mass measuring 3.8 x 3.6 x 3.0 cm is identified centered in the mid left side of the L3 vertebral body that extends posteriorly into the spinal canal causing spinal canal stenosis that is of indeterminate etiology. The images were reviewed today and it appears to be an expansile lesion of L3.\par 2. Subcentimeter lesion lateral superior right side of the sacrum noted, that is of indeterminate etiology.\par 3. At the L2-3, L3-4 and L4-5 levels, a broad-based disc bulge is noted that encroaches the right and left neural foramina.\par 4. At L5-S1 level, grade 1 anterior spondylolisthesis of L5 on S1 and a broad-based disc bulge is noted.

## 2021-07-26 NOTE — DISCUSSION/SUMMARY
[de-identified] : I went over the pathophysiology of the patient's symptoms in great detail with the patient. At this time, he will start a course of physical therapy for his lower back for strengthening and flexibility. A prescription for physical therapy was provided. All of his questions were answered. He understands and consents to the plan.\par \par FU 1 month.\par after undergoing PT for his low back.

## 2021-07-26 NOTE — ADDENDUM
[FreeTextEntry1] : I, Noah Swann, acted solely as a scribe for Dr. Hardy Henning on this date 07/26/2021.\par All medical record entries made by the Scribe were at my, Dr. Hardy Henning, direction and personally dictated by me on 07/26/2021. I have reviewed the chart and agree that the record accurately reflects my personal performance of the history, physical exam, assessment and plan. I have also personally directed, reviewed, and agreed with the chart.

## 2021-07-26 NOTE — PHYSICAL EXAM
[LE] : Sensory: Intact in bilateral lower extremities [de-identified] : Constitutional\par o Appearance : well-nourished, well developed, alert, in no acute distress \par Head and Face\par o Head :\par ¦ Inspection : atraumatic, normocephalic\par o Face :\par ¦ Inspection : no visible rash or discoloration\par Respiratory\par o Respiratory Effort: breathing unlabored \par Neurologic\par o Mental Status Examination :\par ¦ Orientation : alert and oriented X 3\par Psychiatric\par o Mood and Affect: mood normal, affect appropriate\par Cardiovascular\par o Observation/Palpation : - no swelling\par Lymphatic\par o Additional Nodes : No palpable lymph nodes present\par \par Right Upper Extremity \par o Right Shoulder :\par ¦ Inspection/Palpation : no tenderness, swelling or deformities mild anterior capsular tenderness\par ¦ Range of Motion : FROM, mild discomfort at the extremes of motion, no crepitance, mild discomfort with cross chest maneuvers \par ¦ Strength : forward elevation, supraspinatus and external rotation at 90° of abduction 4+/5, internal rotation 5/5, external rotation 5/5, biceps/triceps 5/5\par ¦ Stability : no joint instability on provocative testing \par ¦ Tests: Anaya negative, Neer negative, Kai negative, negative drop arm test \par \par Lumbosacral Spine\par o Inspection : no visible rash or discoloration, no keratosis\par o Palpation : moderate tenderness over the L5/S1 area, no SI joint tenderness\par o Range of Motion : extension, sidebending and rotation cause discomfort\par o Muscle Strength : paraspinal muscle strength and tone within normal limits\par o Muscle Tone : paraspinal muscle strength and tone within normal limits\par o Tests: straight leg test negative and EM test negative bilaterally\par  \par Right Lower Extremity\par o Buttock : no tenderness, swelling or deformities\par o Right Hip :\par ¦ Inspection/Palpation : no tenderness, no swelling or deformities\par ¦ Range of Motion : full and painless in all planes, no crepitance\par ¦ Stability : joint stability intact\par ¦ Strength : extension, flexion, adduction, abduction, internal rotation and external rotation, 5/5\par ¦ Tests: Samina’s test negative\par \par o Right Knee :\par ¦ Inspection/Palpation : no tenderness to palpation, no swelling\par ¦ Range of Motion : active flexion and extension full and painless, no crepitance\par ¦ Stability : no valgus or varus instability present on provocative testing\par ¦ Strength : flexion and extension 5/5\par ¦ Tests and Signs : negative Anterior Drawer, negative Lachman, negative Cassie's test \par \par Left Lower Extremity\par o Buttock : no tenderness, swelling or deformities \par o Left Hip :\par ¦ Inspection/Palpation : no tenderness, no swelling or deformities\par ¦ Range of Motion : full and painless in all planes, no crepitance\par ¦ Stability : joint stability intact\par ¦ Strength : extension, flexion, adduction, abduction, internal rotation and external rotation, 5/5\par ¦ Tests: Samina’s test negative \par \par o Left Knee :\par ¦ Inspection/Palpation : no medial or lateral compartment tenderness to palpation, no swelling, ecchymosis anterior and medially\par ¦ Range of Motion : active flexion and extension full and painless, no crepitance\par ¦ Stability : no valgus or varus instability present on provocative testing\par ¦ Strength : flexion and extension 5/5\par ¦ Tests and Signs : negative Anterior Drawer, negative Lachman, negative Cassie's test \par \par Gait: kyphotic, no significant extremity swelling or lymphedema, good proprioception and balance, no foot drop, poor core strength, limited core strength- can't do a sit up at all

## 2021-07-27 ENCOUNTER — APPOINTMENT (OUTPATIENT)
Dept: ALLERGY | Facility: CLINIC | Age: 71
End: 2021-07-27
Payer: MEDICARE

## 2021-07-27 VITALS
DIASTOLIC BLOOD PRESSURE: 64 MMHG | HEART RATE: 83 BPM | SYSTOLIC BLOOD PRESSURE: 132 MMHG | TEMPERATURE: 98.1 F | OXYGEN SATURATION: 98 % | RESPIRATION RATE: 12 BRPM

## 2021-07-27 PROCEDURE — 96372 THER/PROPH/DIAG INJ SC/IM: CPT

## 2021-07-27 RX ORDER — OMALIZUMAB 150 MG/ML
150 INJECTION, SOLUTION SUBCUTANEOUS
Qty: 0 | Refills: 0 | Status: COMPLETED | OUTPATIENT
Start: 2021-07-26

## 2021-09-02 ENCOUNTER — APPOINTMENT (OUTPATIENT)
Dept: ORTHOPEDIC SURGERY | Facility: CLINIC | Age: 71
End: 2021-09-02

## 2021-09-08 ENCOUNTER — APPOINTMENT (OUTPATIENT)
Dept: ALLERGY | Facility: CLINIC | Age: 71
End: 2021-09-08
Payer: MEDICARE

## 2021-09-08 VITALS
TEMPERATURE: 98.1 F | HEART RATE: 76 BPM | DIASTOLIC BLOOD PRESSURE: 64 MMHG | OXYGEN SATURATION: 98 % | RESPIRATION RATE: 14 BRPM | SYSTOLIC BLOOD PRESSURE: 128 MMHG | HEIGHT: 67 IN

## 2021-09-08 PROCEDURE — 96372 THER/PROPH/DIAG INJ SC/IM: CPT

## 2021-09-08 RX ORDER — OMALIZUMAB 150 MG/ML
150 INJECTION, SOLUTION SUBCUTANEOUS
Qty: 0 | Refills: 0 | Status: COMPLETED | OUTPATIENT
Start: 2021-09-07

## 2021-11-03 ENCOUNTER — APPOINTMENT (OUTPATIENT)
Dept: ALLERGY | Facility: CLINIC | Age: 71
End: 2021-11-03
Payer: MEDICARE

## 2021-11-03 PROCEDURE — 96372 THER/PROPH/DIAG INJ SC/IM: CPT

## 2021-11-03 RX ORDER — OMALIZUMAB 150 MG/ML
150 INJECTION, SOLUTION SUBCUTANEOUS
Qty: 0 | Refills: 0 | Status: COMPLETED | OUTPATIENT
Start: 2021-10-31

## 2021-11-08 VITALS
TEMPERATURE: 97.9 F | SYSTOLIC BLOOD PRESSURE: 122 MMHG | HEART RATE: 80 BPM | RESPIRATION RATE: 14 BRPM | DIASTOLIC BLOOD PRESSURE: 74 MMHG | OXYGEN SATURATION: 96 %

## 2021-11-10 ENCOUNTER — APPOINTMENT (OUTPATIENT)
Dept: ALLERGY | Facility: CLINIC | Age: 71
End: 2021-11-10

## 2021-12-08 ENCOUNTER — APPOINTMENT (OUTPATIENT)
Dept: ALLERGY | Facility: CLINIC | Age: 71
End: 2021-12-08
Payer: MEDICARE

## 2021-12-08 VITALS
OXYGEN SATURATION: 98 % | HEART RATE: 89 BPM | TEMPERATURE: 98.3 F | RESPIRATION RATE: 16 BRPM | SYSTOLIC BLOOD PRESSURE: 120 MMHG | DIASTOLIC BLOOD PRESSURE: 70 MMHG

## 2021-12-08 PROCEDURE — 99214 OFFICE O/P EST MOD 30 MIN: CPT | Mod: 25

## 2021-12-08 PROCEDURE — 96372 THER/PROPH/DIAG INJ SC/IM: CPT

## 2021-12-08 RX ORDER — OMALIZUMAB 150 MG/ML
150 INJECTION, SOLUTION SUBCUTANEOUS
Qty: 0 | Refills: 0 | Status: COMPLETED | OUTPATIENT
Start: 2021-12-05

## 2021-12-08 RX ORDER — EPINEPHRINE 0.3 MG/.3ML
0.3 INJECTION INTRAMUSCULAR
Qty: 1 | Refills: 0 | Status: ACTIVE | COMMUNITY
Start: 2021-12-08 | End: 1900-01-01

## 2021-12-08 NOTE — PHYSICAL EXAM
[Alert] : alert [Well Nourished] : well nourished [Healthy Appearance] : healthy appearance [No Acute Distress] : no acute distress [Well Developed] : well developed [Normal Voice/Communication] : normal voice communication [No Neck Mass] : no neck mass was observed [No LAD] : no lymphadenopathy [Normal Rate and Effort] : normal respiratory rhythm and effort [No Crackles] : no crackles [No Retractions] : no retractions [Bilateral Audible Breath Sounds] : bilateral audible breath sounds [Wheezing] : no wheezing was heard [Normal Rate] : heart rate was normal  [Normal S1, S2] : normal S1 and S2 [No murmur] : no murmur [Regular Rhythm] : with a regular rhythm [Normal Cervical Lymph Nodes] : cervical [Skin Intact] : skin intact  [No Rash] : no rash [No clubbing] : no clubbing [No Edema] : no edema [No Cyanosis] : no cyanosis [Normal Mood] : mood was normal [Normal Affect] : affect was normal [Judgment and Insight Age Appropriate] : judgement and insight is age appropriate [Alert, Awake, Oriented as Age-Appropriate] : alert, awake, oriented as age appropriate [de-identified] : no lip swelling

## 2021-12-08 NOTE — ASSESSMENT
[FreeTextEntry1] : Acute exacerbation of CIU/angioedema after Shingrix vaccination:\par \par Increase Allegra 180 mg x 2 in AM \par Increase Zyrtec 10 mg x 2 in PM \par Prednisone 10 mg QD x 5 D\par Prednisone 5 mg QS x 5 D\par He will need increase in medication prior to his second vaccination

## 2021-12-08 NOTE — HISTORY OF PRESENT ILLNESS
[de-identified] : Two weeks ago he had Shingrix first shot - he has had daily hives and episodic lip swelling - Allegra in the AM - Zyrtec QHS - and no hydroxyzine.  - he cannot lower prednisone below 10 mg.   Cancer is in remission with good scans

## 2021-12-08 NOTE — REVIEW OF SYSTEMS
[Nl] : Genitourinary [FreeTextEntry9] : back pain - see recent diagnosis [de-identified] : see above  [de-identified] : does not recall recent HgA1C

## 2021-12-29 ENCOUNTER — NON-APPOINTMENT (OUTPATIENT)
Age: 71
End: 2021-12-29

## 2022-01-06 ENCOUNTER — APPOINTMENT (OUTPATIENT)
Dept: ALLERGY | Facility: CLINIC | Age: 72
End: 2022-01-06
Payer: MEDICARE

## 2022-01-06 VITALS
DIASTOLIC BLOOD PRESSURE: 70 MMHG | TEMPERATURE: 98.2 F | OXYGEN SATURATION: 97 % | SYSTOLIC BLOOD PRESSURE: 135 MMHG | HEART RATE: 92 BPM | RESPIRATION RATE: 16 BRPM

## 2022-01-06 PROCEDURE — 96372 THER/PROPH/DIAG INJ SC/IM: CPT

## 2022-01-06 PROCEDURE — 99213 OFFICE O/P EST LOW 20 MIN: CPT | Mod: 25

## 2022-01-06 RX ORDER — OMALIZUMAB 150 MG/ML
150 INJECTION, SOLUTION SUBCUTANEOUS
Qty: 0 | Refills: 0 | Status: COMPLETED | OUTPATIENT
Start: 2022-01-02

## 2022-01-06 NOTE — PHYSICAL EXAM
[Alert] : alert [Well Nourished] : well nourished [Healthy Appearance] : healthy appearance [No Acute Distress] : no acute distress [Well Developed] : well developed [Normal Voice/Communication] : normal voice communication [No Neck Mass] : no neck mass was observed [No LAD] : no lymphadenopathy [Normal Rate and Effort] : normal respiratory rhythm and effort [No Crackles] : no crackles [No Retractions] : no retractions [Bilateral Audible Breath Sounds] : bilateral audible breath sounds [Wheezing] : no wheezing was heard [Normal Rate] : heart rate was normal  [Normal S1, S2] : normal S1 and S2 [No murmur] : no murmur [Regular Rhythm] : with a regular rhythm [Normal Cervical Lymph Nodes] : cervical [Skin Intact] : skin intact  [No Rash] : no rash [No clubbing] : no clubbing [No Edema] : no edema [No Cyanosis] : no cyanosis [Normal Mood] : mood was normal [Normal Affect] : affect was normal [Judgment and Insight Age Appropriate] : judgement and insight is age appropriate [Alert, Awake, Oriented as Age-Appropriate] : alert, awake, oriented as age appropriate [de-identified] : no lip swelling

## 2022-01-06 NOTE — ASSESSMENT
[FreeTextEntry1] : CIU/angioedema:\par \par Now that Xolair has been administered lower prednisone 5 mg QD x 5 D than discontinue\par RV recurrent symptoms

## 2022-01-06 NOTE — REVIEW OF SYSTEMS
[Nl] : Genitourinary [FreeTextEntry9] : back pain - see recent diagnosis [de-identified] : see above  [de-identified] : does not recall recent HgA1C

## 2022-01-06 NOTE — HISTORY OF PRESENT ILLNESS
[de-identified] : Patient taking Allegra x 2 in the AM and Zyrtec x 2 in the PM - had some recurrent swelling and he started prednisone 10 mg QD - he tapered to 5 mg and his symptoms recurred and increased back up to 10 mg with good control.

## 2022-01-25 ENCOUNTER — APPOINTMENT (OUTPATIENT)
Dept: ALLERGY | Facility: CLINIC | Age: 72
End: 2022-01-25

## 2022-02-03 ENCOUNTER — APPOINTMENT (OUTPATIENT)
Dept: ALLERGY | Facility: CLINIC | Age: 72
End: 2022-02-03
Payer: MEDICARE

## 2022-02-03 VITALS
DIASTOLIC BLOOD PRESSURE: 65 MMHG | OXYGEN SATURATION: 98 % | SYSTOLIC BLOOD PRESSURE: 120 MMHG | TEMPERATURE: 97.6 F | HEART RATE: 86 BPM | RESPIRATION RATE: 16 BRPM

## 2022-02-03 PROCEDURE — 99214 OFFICE O/P EST MOD 30 MIN: CPT | Mod: 25

## 2022-02-03 PROCEDURE — 96372 THER/PROPH/DIAG INJ SC/IM: CPT

## 2022-02-03 RX ORDER — HYDROXYZINE HYDROCHLORIDE 10 MG/1
10 TABLET ORAL
Qty: 60 | Refills: 0 | Status: ACTIVE | COMMUNITY
Start: 2022-02-03 | End: 1900-01-01

## 2022-02-03 RX ORDER — OMALIZUMAB 150 MG/ML
150 INJECTION, SOLUTION SUBCUTANEOUS
Qty: 0 | Refills: 0 | Status: COMPLETED | OUTPATIENT
Start: 2022-01-30

## 2022-02-03 NOTE — HISTORY OF PRESENT ILLNESS
[de-identified] : Patient is taking Allegra x 2 in the AM and Zyrtec x 2 in the PM - he has tapered off of prednisone - he has had recurrent symptoms since he received his shingles shot - he did well first few weeks after Xolair and than recurrent symptoms.

## 2022-02-03 NOTE — REVIEW OF SYSTEMS
[Nl] : Genitourinary [FreeTextEntry9] : back pain - see recent diagnosis [de-identified] : see above  [de-identified] : does not recall recent HgA1C

## 2022-02-03 NOTE — PHYSICAL EXAM
[Alert] : alert [Well Nourished] : well nourished [Healthy Appearance] : healthy appearance [No Acute Distress] : no acute distress [Well Developed] : well developed [Normal Voice/Communication] : normal voice communication [No Neck Mass] : no neck mass was observed [No LAD] : no lymphadenopathy [Normal Rate and Effort] : normal respiratory rhythm and effort [No Crackles] : no crackles [No Retractions] : no retractions [Bilateral Audible Breath Sounds] : bilateral audible breath sounds [Wheezing] : no wheezing was heard [Normal Rate] : heart rate was normal  [Normal S1, S2] : normal S1 and S2 [No murmur] : no murmur [Regular Rhythm] : with a regular rhythm [Normal Cervical Lymph Nodes] : cervical [Patches] : ~M patches present [No clubbing] : no clubbing [No Edema] : no edema [No Cyanosis] : no cyanosis [Normal Mood] : mood was normal [Normal Affect] : affect was normal [Judgment and Insight Age Appropriate] : judgement and insight is age appropriate [Alert, Awake, Oriented as Age-Appropriate] : alert, awake, oriented as age appropriate [de-identified] : scattered urticarial patches

## 2022-02-03 NOTE — ASSESSMENT
[FreeTextEntry1] : CIU/angioedema:\par \par Allegra 180 mg x  2 in the AM\par Xyzal 5 mg x 2 in the PM \par Hydroxyzine 20 mg QHS\par Prednisone 5 mg QD x 1 week than stop

## 2022-02-24 ENCOUNTER — APPOINTMENT (OUTPATIENT)
Dept: ALLERGY | Facility: CLINIC | Age: 72
End: 2022-02-24
Payer: MEDICARE

## 2022-02-24 VITALS
TEMPERATURE: 98.3 F | SYSTOLIC BLOOD PRESSURE: 120 MMHG | OXYGEN SATURATION: 98 % | RESPIRATION RATE: 16 BRPM | DIASTOLIC BLOOD PRESSURE: 65 MMHG | HEART RATE: 88 BPM

## 2022-02-24 PROCEDURE — 99214 OFFICE O/P EST MOD 30 MIN: CPT

## 2022-02-24 NOTE — REVIEW OF SYSTEMS
[Nl] : Genitourinary [FreeTextEntry9] : back pain - see recent diagnosis [de-identified] : see above  [de-identified] : does not recall recent HgA1C

## 2022-02-24 NOTE — PHYSICAL EXAM
[Alert] : alert [Well Nourished] : well nourished [Healthy Appearance] : healthy appearance [No Acute Distress] : no acute distress [Well Developed] : well developed [Normal Voice/Communication] : normal voice communication [No Neck Mass] : no neck mass was observed [No LAD] : no lymphadenopathy [Normal Rate and Effort] : normal respiratory rhythm and effort [No Crackles] : no crackles [No Retractions] : no retractions [Bilateral Audible Breath Sounds] : bilateral audible breath sounds [Wheezing] : no wheezing was heard [Normal Rate] : heart rate was normal  [Normal S1, S2] : normal S1 and S2 [No murmur] : no murmur [Regular Rhythm] : with a regular rhythm [Normal Cervical Lymph Nodes] : cervical [Skin Intact] : skin intact  [No Rash] : no rash [No clubbing] : no clubbing [No Edema] : no edema [No Cyanosis] : no cyanosis [Normal Mood] : mood was normal [Normal Affect] : affect was normal [Judgment and Insight Age Appropriate] : judgement and insight is age appropriate [Alert, Awake, Oriented as Age-Appropriate] : alert, awake, oriented as age appropriate [de-identified] : no lip swelling

## 2022-02-24 NOTE — HISTORY OF PRESENT ILLNESS
[de-identified] : Patient taking Allegra 180 mg x 2 in the AM - Xyzal 5 mg x 2 in the PM - hydroxyzine 20 mg QHS - prednisone 5 mg QD - occasional skipped dosage - Xolair every 4 weeks -

## 2022-03-03 RX ORDER — PREDNISONE 5 MG/1
5 TABLET ORAL
Qty: 60 | Refills: 0 | Status: ACTIVE | COMMUNITY
Start: 2021-12-08 | End: 1900-01-01

## 2022-03-09 ENCOUNTER — APPOINTMENT (OUTPATIENT)
Dept: ALLERGY | Facility: CLINIC | Age: 72
End: 2022-03-09
Payer: MEDICARE

## 2022-03-09 VITALS
OXYGEN SATURATION: 95 % | DIASTOLIC BLOOD PRESSURE: 79 MMHG | SYSTOLIC BLOOD PRESSURE: 118 MMHG | RESPIRATION RATE: 16 BRPM | TEMPERATURE: 98 F | HEART RATE: 95 BPM

## 2022-03-09 PROCEDURE — 96372 THER/PROPH/DIAG INJ SC/IM: CPT

## 2022-03-09 RX ORDER — OMALIZUMAB 150 MG/ML
150 INJECTION, SOLUTION SUBCUTANEOUS
Qty: 0 | Refills: 0 | Status: COMPLETED | OUTPATIENT
Start: 2022-03-05

## 2022-03-30 ENCOUNTER — APPOINTMENT (OUTPATIENT)
Dept: ALLERGY | Facility: CLINIC | Age: 72
End: 2022-03-30
Payer: MEDICARE

## 2022-03-30 VITALS
OXYGEN SATURATION: 97 % | RESPIRATION RATE: 16 BRPM | DIASTOLIC BLOOD PRESSURE: 80 MMHG | HEART RATE: 93 BPM | TEMPERATURE: 98.3 F | SYSTOLIC BLOOD PRESSURE: 120 MMHG

## 2022-03-30 PROCEDURE — 96372 THER/PROPH/DIAG INJ SC/IM: CPT

## 2022-03-30 RX ORDER — OMALIZUMAB 150 MG/ML
150 INJECTION, SOLUTION SUBCUTANEOUS
Qty: 0 | Refills: 0 | Status: COMPLETED | OUTPATIENT
Start: 2022-03-28

## 2022-04-20 ENCOUNTER — APPOINTMENT (OUTPATIENT)
Dept: ALLERGY | Facility: CLINIC | Age: 72
End: 2022-04-20
Payer: MEDICARE

## 2022-04-20 VITALS
TEMPERATURE: 98.2 F | DIASTOLIC BLOOD PRESSURE: 70 MMHG | HEART RATE: 92 BPM | OXYGEN SATURATION: 98 % | SYSTOLIC BLOOD PRESSURE: 122 MMHG | RESPIRATION RATE: 16 BRPM

## 2022-04-20 PROCEDURE — 96372 THER/PROPH/DIAG INJ SC/IM: CPT

## 2022-04-20 RX ORDER — OMALIZUMAB 150 MG/ML
150 INJECTION, SOLUTION SUBCUTANEOUS
Qty: 0 | Refills: 0 | Status: COMPLETED | OUTPATIENT
Start: 2022-04-17

## 2022-04-26 RX ORDER — LEVOCETIRIZINE DIHYDROCHLORIDE 5 MG/1
5 TABLET ORAL DAILY
Qty: 90 | Refills: 2 | Status: ACTIVE | COMMUNITY
Start: 2020-07-28 | End: 1900-01-01

## 2022-05-11 ENCOUNTER — APPOINTMENT (OUTPATIENT)
Dept: ALLERGY | Facility: CLINIC | Age: 72
End: 2022-05-11
Payer: MEDICARE

## 2022-05-11 VITALS
DIASTOLIC BLOOD PRESSURE: 70 MMHG | RESPIRATION RATE: 15 BRPM | TEMPERATURE: 98.4 F | SYSTOLIC BLOOD PRESSURE: 130 MMHG | OXYGEN SATURATION: 98 % | HEART RATE: 87 BPM

## 2022-05-11 PROCEDURE — 96372 THER/PROPH/DIAG INJ SC/IM: CPT

## 2022-05-11 RX ORDER — OMALIZUMAB 150 MG/ML
150 INJECTION, SOLUTION SUBCUTANEOUS
Qty: 0 | Refills: 0 | Status: COMPLETED | OUTPATIENT
Start: 2022-05-08

## 2022-06-02 ENCOUNTER — APPOINTMENT (OUTPATIENT)
Dept: ORTHOPEDIC SURGERY | Facility: CLINIC | Age: 72
End: 2022-06-02
Payer: MEDICARE

## 2022-06-02 PROCEDURE — 72100 X-RAY EXAM L-S SPINE 2/3 VWS: CPT

## 2022-06-02 PROCEDURE — 99214 OFFICE O/P EST MOD 30 MIN: CPT

## 2022-06-02 PROCEDURE — 72170 X-RAY EXAM OF PELVIS: CPT

## 2022-06-02 NOTE — PHYSICAL EXAM
[LE] : Sensory: Intact in bilateral lower extremities [de-identified] : Constitutional\par o Appearance : well-nourished, well developed, alert, in no acute distress \par Head and Face\par o Head :\par ¦ Inspection : atraumatic, normocephalic\par o Face :\par ¦ Inspection : no visible rash or discoloration\par Respiratory\par o Respiratory Effort: breathing unlabored \par Neurologic\par o Mental Status Examination :\par ¦ Orientation : alert and oriented X 3\par Psychiatric\par o Mood and Affect: mood normal, affect appropriate\par Cardiovascular\par o Observation/Palpation : - no swelling\par Lymphatic\par o Additional Nodes : No palpable lymph nodes present\par \par Right Upper Extremity \par o Right Shoulder :\par ¦ Inspection/Palpation : no tenderness, swelling or deformities mild anterior capsular tenderness\par ¦ Range of Motion : FROM, mild discomfort at the extremes of motion, no crepitance, mild discomfort with cross chest maneuvers \par ¦ Strength : forward elevation, supraspinatus and external rotation at 90° of abduction 4+/5, internal rotation 5/5, external rotation 5/5, biceps/triceps 5/5\par ¦ Stability : no joint instability on provocative testing \par ¦ Tests: Anaya negative, Neer negative, Kai negative, negative drop arm test \par \par Lumbosacral Spine\par o Inspection : no visible rash or discoloration, no keratosis\par o Palpation : moderate tenderness over the L5/S1 area, no SI joint tenderness\par o Range of Motion : extension and sidebending reproduce his symptoms, rotation does not cause discomfort \par o Muscle Strength : paraspinal muscle strength and tone within normal limits\par o Muscle Tone : paraspinal muscle strength and tone within normal limits\par o Tests: straight leg test negative and EM test negative bilaterally\par  \par Right Lower Extremity\par o Buttock : no tenderness, swelling or deformities\par o Right Hip :\par ¦ Inspection/Palpation : no tenderness, no swelling or deformities\par ¦ Range of Motion : full and painless in all planes, no crepitance\par ¦ Stability : joint stability intact\par ¦ Strength : extension, flexion, adduction, abduction, internal rotation and external rotation, 3+/5\par ¦ Tests: Samina’s test negative\par \par Left Lower Extremity\par o Buttock : no tenderness, swelling or deformities \par o Left Hip :\par ¦ Inspection/Palpation : no tenderness, no swelling or deformities\par ¦ Range of Motion : full and painless in all planes, no crepitance\par ¦ Stability : joint stability intact\par ¦ Strength : extension, flexion, adduction, abduction, internal rotation and external rotation, 5/5\par ¦ Tests: Samina’s test negative \par \par Gait: kyphotic, no significant extremity swelling or lymphedema, good proprioception and balance, normal sensation to light touch, no foot drop, poor core strength, limited core strength\par \par Radiology Results\par o Lumbosacral Spine : AP and lateral views were obtained and reveal diffuse facet arthropathy with severe foraminal stenosis at L4/L5 and L5/S1. Diffuse anterior osteophytes worse at T11/T12 and L1/L2 consistent with DISH.\par o Pelvis : AP pelvis was obtained and reveals moderate arthritis of the right hip. No lytic lesions visible.

## 2022-06-02 NOTE — DISCUSSION/SUMMARY
[de-identified] : I discussed the underlying pathophysiology of the patient's condition in great detail with the patient. I went over the patient's x-rays with them in great detail. The extent of the patient’s arthritis was discussed in great detail with them. We discussed the use of ice, Tylenol and anti-inflammatories to relieve pain. I stressed the importance of weight loss and its benefits to the patient’s joints and overall health. At this time, he will start a course of physical therapy for strengthening and flexibility. A prescription was provided. All of his questions were answered. He understands and consents to the plan.\par \par FU in 1 month.\par after undergoing PT for his low back and right hip.\par after focusing on losing weight.

## 2022-06-02 NOTE — ADDENDUM
[FreeTextEntry1] : I, Noah Swann, acted solely as a scribe for Dr. Hardy Henning on this date 06/02/2022.\par All medical record entries made by the Scribe were at my, Dr. Hardy Henning, direction and personally dictated by me on 06/02/2022. I have reviewed the chart and agree that the record accurately reflects my personal performance of the history, physical exam, assessment and plan. I have also personally directed, reviewed, and agreed with the chart.

## 2022-06-02 NOTE — HISTORY OF PRESENT ILLNESS
[de-identified] : 71 year old male presents complaining about low back pain that worsened a few weeks ago. He denies an injury. He notes pain in the right buttock that prevents him from sitting on hard surfaces for long periods of time. He was unable to sleep on the right side but that has improved. He notes radiation of pain down the right leg.\par Hx of Hodgkin's lymphoma in his back diagnosed in 11/2020, chemotherapy ended in 5/2021 with complete response. He had a PET CT last week that was clear. Pmhx of DM on Glipizide and Metformin. Notes normal kidney function.\par \par PET CT done at [facility] on 05/30/2022 : No evidence of FDG avid lymphadenopathy. No aggressive osseous lesions are seen on CT. Multilevel degenerative changes in the spine.\par \par MRI of the Lumbar Spine done at [facility] on 9/24/2020 revealed:\par 1. A mass measuring 3.8 x 3.6 x 3.0 cm is identified centered in the mid left side of the L3 vertebral body that extends posteriorly into the spinal canal causing spinal canal stenosis that is of indeterminate etiology. The images were reviewed today and it appears to be an expansile lesion of L3.\par 2. Subcentimeter lesion lateral superior right side of the sacrum noted, that is of indeterminate etiology.\par 3. At the L2-3, L3-4 and L4-5 levels, a broad-based disc bulge is noted that encroaches the right and left neural foramina.\par 4. At L5-S1 level, grade 1 anterior spondylolisthesis of L5 on S1 and a broad-based disc bulge is noted.

## 2022-06-08 ENCOUNTER — APPOINTMENT (OUTPATIENT)
Dept: ALLERGY | Facility: CLINIC | Age: 72
End: 2022-06-08
Payer: MEDICARE

## 2022-06-08 PROCEDURE — 96372 THER/PROPH/DIAG INJ SC/IM: CPT

## 2022-06-08 RX ORDER — OMALIZUMAB 150 MG/ML
150 INJECTION, SOLUTION SUBCUTANEOUS
Qty: 0 | Refills: 0 | Status: COMPLETED | OUTPATIENT
Start: 2022-06-06

## 2022-07-07 ENCOUNTER — APPOINTMENT (OUTPATIENT)
Dept: ALLERGY | Facility: CLINIC | Age: 72
End: 2022-07-07

## 2022-07-07 VITALS
SYSTOLIC BLOOD PRESSURE: 132 MMHG | HEART RATE: 71 BPM | DIASTOLIC BLOOD PRESSURE: 68 MMHG | TEMPERATURE: 98 F | RESPIRATION RATE: 14 BRPM | OXYGEN SATURATION: 97 %

## 2022-07-07 PROCEDURE — 96372 THER/PROPH/DIAG INJ SC/IM: CPT

## 2022-07-07 RX ORDER — OMALIZUMAB 150 MG/ML
150 INJECTION, SOLUTION SUBCUTANEOUS
Qty: 0 | Refills: 0 | Status: COMPLETED | OUTPATIENT
Start: 2022-07-04

## 2022-07-14 ENCOUNTER — APPOINTMENT (OUTPATIENT)
Dept: ORTHOPEDIC SURGERY | Facility: CLINIC | Age: 72
End: 2022-07-14

## 2022-07-14 VITALS — HEIGHT: 67 IN | BODY MASS INDEX: 42.22 KG/M2 | WEIGHT: 269 LBS

## 2022-07-14 PROCEDURE — 99214 OFFICE O/P EST MOD 30 MIN: CPT

## 2022-07-14 NOTE — PHYSICAL EXAM
[LE] : Sensory: Intact in bilateral lower extremities [de-identified] : Constitutional\par o Appearance : well-nourished, well developed, alert, in no acute distress \par Head and Face\par o Head :\par ¦ Inspection : atraumatic, normocephalic\par o Face :\par ¦ Inspection : no visible rash or discoloration\par Respiratory\par o Respiratory Effort: breathing unlabored \par Neurologic\par o Mental Status Examination :\par ¦ Orientation : alert and oriented X 3\par Psychiatric\par o Mood and Affect: mood normal, affect appropriate\par Cardiovascular\par o Observation/Palpation : - no swelling\par Lymphatic\par o Additional Nodes : No palpable lymph nodes present\par \par Right Upper Extremity \par o Right Shoulder :\par ¦ Inspection/Palpation : no tenderness, swelling or deformities mild anterior capsular tenderness\par ¦ Range of Motion : FROM, mild discomfort at the extremes of motion, no crepitance, mild discomfort with cross chest maneuvers \par ¦ Strength : forward elevation, supraspinatus and external rotation at 90° of abduction 4+/5, internal rotation 5/5, external rotation 5/5, biceps/triceps 5/5\par ¦ Stability : no joint instability on provocative testing \par ¦ Tests: Anaya negative, Neer negative, Kai negative, negative drop arm test \par \par Lumbosacral Spine\par o Inspection : no visible rash or discoloration, no keratosis, kyphotic\par o Palpation : no tenderness over the L5/S1 area, no sciatic notch or SI joint tenderness, no paraspinal musculature tenderness \par o Range of Motion : sidebending causes lateral thigh discomfort, rotation causes minimal discomfort \par o Muscle Strength : paraspinal muscle strength and tone within normal limits\par o Muscle Tone : paraspinal muscle strength and tone within normal limits\par o Tests: straight leg test negative and EM test negative bilaterally\par  \par Right Lower Extremity\par o Buttock : no tenderness, swelling or deformities\par o Right Hip :\par ¦ Inspection/Palpation : no greater trochanteric or ITB tenderness, no swelling or deformities\par ¦ Range of Motion : full flexion and external rotation, limited internal rotation, no crepitance\par ¦ Stability : joint stability intact\par ¦ Strength : extension, flexion, adduction, abduction, internal rotation and external rotation, 4+/5\par ¦ Tests: Samina’s test negative\par \par Left Lower Extremity\par o Buttock : no tenderness, swelling or deformities \par o Left Hip :\par ¦ Inspection/Palpation : no greater trochanteric or ITB tenderness, no swelling or deformities\par ¦ Range of Motion : full flexion and external rotation, limited internal rotation, no crepitance\par ¦ Stability : joint stability intact\par ¦ Strength : extension, flexion, adduction, abduction, internal rotation and external rotation, 4+/5\par ¦ Tests: Samina’s test negative \par \par Gait: kyphotic, no significant extremity swelling or lymphedema, good proprioception and balance, normal sensation to light touch, equal leg lengths, no foot drop, limited core strength

## 2022-07-14 NOTE — HISTORY OF PRESENT ILLNESS
[de-identified] : 71 year old male presents with low back pain radiating down the right leg. He is attending PT and is feeling better. He does not have much back pain and can bend over without pain. He denies numbness and tingling down his legs. He can walk longer distances (about half a mile) before his back starts bothering him. He wants to keep going to PT. \par Hx of Hodgkin's lymphoma in his back diagnosed in 11/2020, chemotherapy ended in 5/2021 with complete response. He had a recent PET CT that was clear. Pmhx of DM on Glipizide and Metformin. Notes normal kidney function.\par \par Radiology Results 6/2/2022:\par o Lumbosacral Spine : AP and lateral views were obtained and reveal diffuse facet arthropathy with severe foraminal stenosis at L4/L5 and L5/S1. Diffuse anterior osteophytes worse at T11/T12 and L1/L2 consistent with DISH.\par o Pelvis : AP pelvis was obtained and reveals moderate arthritis of the right hip. No lytic lesions visible. \par \par PET CT done at [facility] on 05/30/2022 : No evidence of FDG avid lymphadenopathy. No aggressive osseous lesions are seen on CT. Multilevel degenerative changes in the spine.\par \par MRI Lumbar Spine done at [facility] on 9/24/2020:\par 1. A mass measuring 3.8 x 3.6 x 3.0 cm is identified centered in the mid left side of the L3 vertebral body that extends posteriorly into the spinal canal causing spinal canal stenosis that is of indeterminate etiology. The images were reviewed today and it appears to be an expansile lesion of L3.\par 2. Subcentimeter lesion lateral superior right side of the sacrum noted, that is of indeterminate etiology.\par 3. At the L2-3, L3-4 and L4-5 levels, a broad-based disc bulge is noted that encroaches the right and left neural foramina.\par 4. At L5-S1 level, grade 1 anterior spondylolisthesis of L5 on S1 and a broad-based disc bulge is noted.

## 2022-07-14 NOTE — DISCUSSION/SUMMARY
[de-identified] : I went over the pathophysiology of the patient's symptoms in great detail with the patient. I stressed the importance of weight loss and its benefits to the patient’s joints and overall health. I am recommending the patient continues going to physical therapy to obtain increases in his strength and mobility. A prescription was provided. I would like to see the patient back in the office in 6 weeks to reassess his condition. All of their questions were answered. They understand and consent to the plan.\par \par FU in 6 weeks.\par after continuing PT for his low back and focusing on losing weight.

## 2022-07-14 NOTE — ADDENDUM
[FreeTextEntry1] : I, Noah Swann, acted solely as a scribe for Dr. Hardy Henning on this date 07/14/2022.\par All medical record entries made by the Scribe were at my, Dr. Hardy Henning, direction and personally dictated by me on 07/14/2022. I have reviewed the chart and agree that the record accurately reflects my personal performance of the history, physical exam, assessment and plan. I have also personally directed, reviewed, and agreed with the chart.

## 2022-07-30 ENCOUNTER — NON-APPOINTMENT (OUTPATIENT)
Age: 72
End: 2022-07-30

## 2022-08-04 ENCOUNTER — APPOINTMENT (OUTPATIENT)
Dept: ALLERGY | Facility: CLINIC | Age: 72
End: 2022-08-04

## 2022-08-04 VITALS
SYSTOLIC BLOOD PRESSURE: 124 MMHG | BODY MASS INDEX: 42.22 KG/M2 | WEIGHT: 269 LBS | OXYGEN SATURATION: 97 % | DIASTOLIC BLOOD PRESSURE: 70 MMHG | HEIGHT: 67 IN | RESPIRATION RATE: 12 BRPM | HEART RATE: 82 BPM | TEMPERATURE: 98.3 F

## 2022-08-04 PROCEDURE — 96372 THER/PROPH/DIAG INJ SC/IM: CPT

## 2022-08-04 RX ORDER — OMALIZUMAB 150 MG/ML
150 INJECTION, SOLUTION SUBCUTANEOUS
Qty: 0 | Refills: 0 | Status: COMPLETED | OUTPATIENT
Start: 2022-07-31

## 2022-08-31 ENCOUNTER — APPOINTMENT (OUTPATIENT)
Dept: ORTHOPEDIC SURGERY | Facility: CLINIC | Age: 72
End: 2022-08-31

## 2022-09-01 ENCOUNTER — APPOINTMENT (OUTPATIENT)
Dept: ALLERGY | Facility: CLINIC | Age: 72
End: 2022-09-01

## 2022-09-01 VITALS
HEIGHT: 67 IN | BODY MASS INDEX: 42.22 KG/M2 | DIASTOLIC BLOOD PRESSURE: 70 MMHG | OXYGEN SATURATION: 96 % | WEIGHT: 269 LBS | TEMPERATURE: 98.4 F | HEART RATE: 87 BPM | SYSTOLIC BLOOD PRESSURE: 130 MMHG | RESPIRATION RATE: 13 BRPM

## 2022-09-01 PROCEDURE — 96372 THER/PROPH/DIAG INJ SC/IM: CPT | Mod: 59

## 2022-09-01 RX ORDER — OMALIZUMAB 150 MG/ML
150 INJECTION, SOLUTION SUBCUTANEOUS
Qty: 0 | Refills: 0 | Status: COMPLETED | OUTPATIENT
Start: 2022-08-29

## 2022-09-29 ENCOUNTER — APPOINTMENT (OUTPATIENT)
Dept: ALLERGY | Facility: CLINIC | Age: 72
End: 2022-09-29

## 2022-09-29 VITALS
BODY MASS INDEX: 42.22 KG/M2 | SYSTOLIC BLOOD PRESSURE: 124 MMHG | OXYGEN SATURATION: 97 % | TEMPERATURE: 97.1 F | WEIGHT: 269 LBS | HEIGHT: 67 IN | HEART RATE: 82 BPM | DIASTOLIC BLOOD PRESSURE: 68 MMHG

## 2022-09-29 PROCEDURE — 96372 THER/PROPH/DIAG INJ SC/IM: CPT

## 2022-09-29 RX ORDER — OMALIZUMAB 150 MG/ML
150 INJECTION, SOLUTION SUBCUTANEOUS
Qty: 0 | Refills: 0 | Status: COMPLETED | OUTPATIENT
Start: 2022-09-26

## 2022-10-03 ENCOUNTER — APPOINTMENT (OUTPATIENT)
Dept: ALLERGY | Facility: CLINIC | Age: 72
End: 2022-10-03

## 2022-10-03 VITALS
WEIGHT: 263 LBS | OXYGEN SATURATION: 96 % | HEART RATE: 84 BPM | DIASTOLIC BLOOD PRESSURE: 68 MMHG | HEIGHT: 67 IN | TEMPERATURE: 98.4 F | BODY MASS INDEX: 41.28 KG/M2 | SYSTOLIC BLOOD PRESSURE: 122 MMHG

## 2022-10-03 PROCEDURE — 99214 OFFICE O/P EST MOD 30 MIN: CPT

## 2022-10-03 RX ORDER — PEN NEEDLE, DIABETIC 29 G X1/2"
32G X 4 MM NEEDLE, DISPOSABLE MISCELLANEOUS
Qty: 90 | Refills: 0 | Status: ACTIVE | COMMUNITY
Start: 2022-05-14

## 2022-10-03 RX ORDER — PRASUGREL 10 MG/1
10 TABLET, FILM COATED ORAL
Qty: 90 | Refills: 0 | Status: ACTIVE | COMMUNITY
Start: 2022-09-12

## 2022-10-03 RX ORDER — LIRAGLUTIDE 6 MG/ML
INJECTION SUBCUTANEOUS
Refills: 0 | Status: DISCONTINUED | COMMUNITY
End: 2022-10-03

## 2022-10-03 RX ORDER — IRBESARTAN 150 MG/1
150 TABLET ORAL
Qty: 90 | Refills: 0 | Status: ACTIVE | COMMUNITY
Start: 2022-07-15

## 2022-10-03 RX ORDER — MUPIROCIN 20 MG/G
2 OINTMENT TOPICAL
Qty: 22 | Refills: 0 | Status: ACTIVE | COMMUNITY
Start: 2022-08-03

## 2022-10-03 RX ORDER — LEVOTHYROXINE SODIUM 0.2 MG/1
200 TABLET ORAL
Qty: 90 | Refills: 0 | Status: ACTIVE | COMMUNITY
Start: 2022-07-13

## 2022-10-03 RX ORDER — TESTOSTERONE 16.2 MG/G
20.25 MG/ACT GEL TRANSDERMAL
Qty: 450 | Refills: 0 | Status: ACTIVE | COMMUNITY
Start: 2022-06-09

## 2022-10-03 RX ORDER — SEMAGLUTIDE 1.34 MG/ML
2 INJECTION, SOLUTION SUBCUTANEOUS
Qty: 4 | Refills: 0 | Status: ACTIVE | COMMUNITY
Start: 2022-06-09

## 2022-10-03 RX ORDER — AMOXICILLIN 500 MG/1
500 CAPSULE ORAL
Qty: 21 | Refills: 0 | Status: DISCONTINUED | COMMUNITY
Start: 2022-09-02

## 2022-10-03 NOTE — PHYSICAL EXAM
[Alert] : alert [Well Nourished] : well nourished [Healthy Appearance] : healthy appearance [No Acute Distress] : no acute distress [Well Developed] : well developed [Normal Voice/Communication] : normal voice communication [No Neck Mass] : no neck mass was observed [No LAD] : no lymphadenopathy [Normal Rate and Effort] : normal respiratory rhythm and effort [No Crackles] : no crackles [No Retractions] : no retractions [Bilateral Audible Breath Sounds] : bilateral audible breath sounds [Normal Rate] : heart rate was normal  [Normal S1, S2] : normal S1 and S2 [No murmur] : no murmur [Regular Rhythm] : with a regular rhythm [Normal Cervical Lymph Nodes] : cervical [Skin Intact] : skin intact  [No Rash] : no rash [No clubbing] : no clubbing [No Edema] : no edema [No Cyanosis] : no cyanosis [Normal Mood] : mood was normal [Normal Affect] : affect was normal [Judgment and Insight Age Appropriate] : judgement and insight is age appropriate [Alert, Awake, Oriented as Age-Appropriate] : alert, awake, oriented as age appropriate [de-identified] : no lip swelling  [de-identified] : few scattered urticarial patches

## 2022-10-03 NOTE — ASSESSMENT
[FreeTextEntry1] : CIU in patient with type 2 diabetes\par \par Continue Xolair Q 4weeks\par Continue Allegra x 2 in AM\par Continue Xyzal 5 mg Q PM\par Continue Hydroxyzine 20 mg QHS \par Add Diprolene BID prn symptoms\par \par Advised patient about COVID treatment options if he tests positive.

## 2022-10-03 NOTE — HISTORY OF PRESENT ILLNESS
[de-identified] : 4 weeks ago chest tightness - work up negative - admitted to [facility] - angiogram - 1 stent and second one has to be done - he was also have exertional SOB - less symptoms since he had procedure done - he has had sporadic hives - \par \par Xolair Q 4 weeks\par Allegra x 2 in AM \par Xyzal x 1 mid day\par Hydroxyzine 20 mg QHS \par \par He is off prednisone x 2 weeks and he has been off x 2 weeks.  \par \par Patient has never had COVID - he has received all booster vaccinations

## 2022-10-03 NOTE — REVIEW OF SYSTEMS
[Nl] : Genitourinary [FreeTextEntry9] : back pain - see recent diagnosis [de-identified] : see above  [de-identified] : does not recall recent HgA1C

## 2022-10-21 ENCOUNTER — NON-APPOINTMENT (OUTPATIENT)
Age: 72
End: 2022-10-21

## 2022-10-26 ENCOUNTER — APPOINTMENT (OUTPATIENT)
Dept: ALLERGY | Facility: CLINIC | Age: 72
End: 2022-10-26

## 2022-10-26 VITALS
OXYGEN SATURATION: 97 % | HEART RATE: 83 BPM | DIASTOLIC BLOOD PRESSURE: 66 MMHG | WEIGHT: 263 LBS | TEMPERATURE: 97.8 F | BODY MASS INDEX: 41.28 KG/M2 | HEIGHT: 67 IN | SYSTOLIC BLOOD PRESSURE: 118 MMHG

## 2022-10-26 PROCEDURE — 96372 THER/PROPH/DIAG INJ SC/IM: CPT

## 2022-10-26 RX ORDER — OMALIZUMAB 150 MG/ML
150 INJECTION, SOLUTION SUBCUTANEOUS
Qty: 0 | Refills: 0 | Status: COMPLETED | OUTPATIENT
Start: 2022-10-23

## 2022-11-23 ENCOUNTER — APPOINTMENT (OUTPATIENT)
Dept: ALLERGY | Facility: CLINIC | Age: 72
End: 2022-11-23

## 2022-11-23 VITALS
WEIGHT: 263 LBS | TEMPERATURE: 98.1 F | HEART RATE: 88 BPM | SYSTOLIC BLOOD PRESSURE: 128 MMHG | HEIGHT: 67 IN | OXYGEN SATURATION: 97 % | BODY MASS INDEX: 41.28 KG/M2 | DIASTOLIC BLOOD PRESSURE: 66 MMHG

## 2022-11-23 PROCEDURE — 96372 THER/PROPH/DIAG INJ SC/IM: CPT

## 2022-11-23 RX ORDER — OMALIZUMAB 150 MG/ML
150 INJECTION, SOLUTION SUBCUTANEOUS
Qty: 0 | Refills: 0 | Status: COMPLETED | OUTPATIENT
Start: 2022-11-22

## 2023-01-04 ENCOUNTER — APPOINTMENT (OUTPATIENT)
Dept: ALLERGY | Facility: CLINIC | Age: 73
End: 2023-01-04
Payer: MEDICARE

## 2023-01-04 VITALS
DIASTOLIC BLOOD PRESSURE: 80 MMHG | HEART RATE: 73 BPM | BODY MASS INDEX: 41.28 KG/M2 | HEIGHT: 67 IN | OXYGEN SATURATION: 99 % | TEMPERATURE: 97.6 F | WEIGHT: 263 LBS | SYSTOLIC BLOOD PRESSURE: 132 MMHG

## 2023-01-04 PROCEDURE — 96372 THER/PROPH/DIAG INJ SC/IM: CPT | Mod: 59

## 2023-01-04 RX ORDER — OMALIZUMAB 150 MG/ML
150 INJECTION, SOLUTION SUBCUTANEOUS
Qty: 0 | Refills: 0 | Status: COMPLETED | OUTPATIENT
Start: 2023-01-02

## 2023-02-01 ENCOUNTER — APPOINTMENT (OUTPATIENT)
Dept: ALLERGY | Facility: CLINIC | Age: 73
End: 2023-02-01
Payer: MEDICARE

## 2023-02-01 VITALS
SYSTOLIC BLOOD PRESSURE: 122 MMHG | WEIGHT: 263 LBS | OXYGEN SATURATION: 97 % | BODY MASS INDEX: 41.28 KG/M2 | DIASTOLIC BLOOD PRESSURE: 60 MMHG | HEART RATE: 87 BPM | TEMPERATURE: 98.1 F | HEIGHT: 67 IN

## 2023-02-01 PROCEDURE — 96372 THER/PROPH/DIAG INJ SC/IM: CPT

## 2023-02-01 RX ORDER — PREDNISONE 10 MG/1
10 TABLET ORAL
Qty: 25 | Refills: 0 | Status: ACTIVE | COMMUNITY
Start: 2017-10-02 | End: 1900-01-01

## 2023-02-01 RX ORDER — OMALIZUMAB 150 MG/ML
150 INJECTION, SOLUTION SUBCUTANEOUS
Qty: 0 | Refills: 0 | Status: COMPLETED | OUTPATIENT
Start: 2023-01-29

## 2023-03-01 ENCOUNTER — APPOINTMENT (OUTPATIENT)
Dept: ALLERGY | Facility: CLINIC | Age: 73
End: 2023-03-01
Payer: MEDICARE

## 2023-03-01 VITALS
WEIGHT: 263 LBS | BODY MASS INDEX: 41.28 KG/M2 | OXYGEN SATURATION: 98 % | DIASTOLIC BLOOD PRESSURE: 70 MMHG | SYSTOLIC BLOOD PRESSURE: 122 MMHG | HEART RATE: 73 BPM | HEIGHT: 67 IN | TEMPERATURE: 98 F

## 2023-03-01 PROCEDURE — 96372 THER/PROPH/DIAG INJ SC/IM: CPT

## 2023-03-01 RX ORDER — OMALIZUMAB 150 MG/ML
150 INJECTION, SOLUTION SUBCUTANEOUS
Qty: 0 | Refills: 0 | Status: COMPLETED | OUTPATIENT
Start: 2023-02-27

## 2023-03-30 ENCOUNTER — APPOINTMENT (OUTPATIENT)
Dept: ALLERGY | Facility: CLINIC | Age: 73
End: 2023-03-30
Payer: MEDICARE

## 2023-03-30 VITALS
SYSTOLIC BLOOD PRESSURE: 130 MMHG | BODY MASS INDEX: 41.28 KG/M2 | DIASTOLIC BLOOD PRESSURE: 80 MMHG | TEMPERATURE: 98.2 F | HEART RATE: 82 BPM | HEIGHT: 67 IN | WEIGHT: 263 LBS | OXYGEN SATURATION: 98 %

## 2023-03-30 PROCEDURE — 96372 THER/PROPH/DIAG INJ SC/IM: CPT

## 2023-03-30 RX ORDER — OMALIZUMAB 150 MG/ML
150 INJECTION, SOLUTION SUBCUTANEOUS
Qty: 0 | Refills: 0 | Status: COMPLETED | OUTPATIENT
Start: 2023-03-27

## 2023-04-27 ENCOUNTER — APPOINTMENT (OUTPATIENT)
Dept: ALLERGY | Facility: CLINIC | Age: 73
End: 2023-04-27
Payer: MEDICARE

## 2023-04-27 VITALS
TEMPERATURE: 98 F | WEIGHT: 252 LBS | DIASTOLIC BLOOD PRESSURE: 66 MMHG | OXYGEN SATURATION: 98 % | SYSTOLIC BLOOD PRESSURE: 138 MMHG | BODY MASS INDEX: 39.55 KG/M2 | RESPIRATION RATE: 14 BRPM | HEART RATE: 83 BPM | HEIGHT: 67 IN

## 2023-04-27 PROCEDURE — 96372 THER/PROPH/DIAG INJ SC/IM: CPT

## 2023-04-27 RX ORDER — OMALIZUMAB 150 MG/ML
150 INJECTION, SOLUTION SUBCUTANEOUS
Qty: 0 | Refills: 0 | Status: COMPLETED | OUTPATIENT
Start: 2023-04-27

## 2023-05-10 ENCOUNTER — APPOINTMENT (OUTPATIENT)
Dept: ALLERGY | Facility: CLINIC | Age: 73
End: 2023-05-10
Payer: MEDICARE

## 2023-05-10 VITALS
SYSTOLIC BLOOD PRESSURE: 140 MMHG | HEART RATE: 75 BPM | BODY MASS INDEX: 39.24 KG/M2 | TEMPERATURE: 97.8 F | WEIGHT: 250 LBS | OXYGEN SATURATION: 98 % | HEIGHT: 67 IN | DIASTOLIC BLOOD PRESSURE: 66 MMHG | RESPIRATION RATE: 14 BRPM

## 2023-05-10 PROCEDURE — 99214 OFFICE O/P EST MOD 30 MIN: CPT

## 2023-05-10 NOTE — ASSESSMENT
[FreeTextEntry1] : CIU - angioedema \par \par Continue Allegra 180 mg in AM\par Continue Xyzal 5 mg Q PM\par Continue Hydroxyzine 20 mg prn \par Patient will try Xolair every 5 weeks as trial \par \par

## 2023-05-10 NOTE — REASON FOR VISIT
[Routine Follow-Up] : a routine follow-up visit for [FreeTextEntry3] : follow up of CIU and angioedema

## 2023-05-10 NOTE — HISTORY OF PRESENT ILLNESS
[de-identified] : Patient is taking Xolair every 4 weeks - he is taking Allegra 180 mg in AM - Xyzal 5 mg QHS - lymphoma is in remission x 2 years.   Patient is in cardiac rehab - HgA1C - he is taking Ozempic.    He is doing well with this regimen - he has not taken any prednisone for 7 weeks - last time he took prednisone 10 mg QD x 2 D.

## 2023-05-10 NOTE — REVIEW OF SYSTEMS
[Nl] : Genitourinary [FreeTextEntry9] : back pain - see recent diagnosis [de-identified] : see above  [de-identified] : does not recall recent HgA1C

## 2023-05-10 NOTE — PHYSICAL EXAM
[Alert] : alert [Well Nourished] : well nourished [Healthy Appearance] : healthy appearance [No Acute Distress] : no acute distress [Well Developed] : well developed [Normal Voice/Communication] : normal voice communication [No Neck Mass] : no neck mass was observed [No LAD] : no lymphadenopathy [Normal Rate and Effort] : normal respiratory rhythm and effort [No Crackles] : no crackles [No Retractions] : no retractions [Bilateral Audible Breath Sounds] : bilateral audible breath sounds [Normal Rate] : heart rate was normal  [Normal S1, S2] : normal S1 and S2 [No murmur] : no murmur [Regular Rhythm] : with a regular rhythm [Normal Cervical Lymph Nodes] : cervical [Skin Intact] : skin intact  [No Rash] : no rash [Patches] : no patches [No clubbing] : no clubbing [No Edema] : no edema [No Cyanosis] : no cyanosis [Normal Mood] : mood was normal [Normal Affect] : affect was normal [Judgment and Insight Age Appropriate] : judgement and insight is age appropriate [Alert, Awake, Oriented as Age-Appropriate] : alert, awake, oriented as age appropriate [de-identified] : no lip swelling

## 2023-05-17 ENCOUNTER — APPOINTMENT (OUTPATIENT)
Dept: ORTHOPEDIC SURGERY | Facility: CLINIC | Age: 73
End: 2023-05-17
Payer: MEDICARE

## 2023-05-17 VITALS — WEIGHT: 250 LBS | BODY MASS INDEX: 39.24 KG/M2 | HEIGHT: 67 IN

## 2023-05-17 PROCEDURE — 72100 X-RAY EXAM L-S SPINE 2/3 VWS: CPT

## 2023-05-17 PROCEDURE — 72170 X-RAY EXAM OF PELVIS: CPT

## 2023-05-17 PROCEDURE — 99214 OFFICE O/P EST MOD 30 MIN: CPT

## 2023-05-25 ENCOUNTER — APPOINTMENT (OUTPATIENT)
Dept: ALLERGY | Facility: CLINIC | Age: 73
End: 2023-05-25
Payer: MEDICARE

## 2023-05-25 VITALS
DIASTOLIC BLOOD PRESSURE: 60 MMHG | RESPIRATION RATE: 14 BRPM | HEIGHT: 67 IN | OXYGEN SATURATION: 99 % | BODY MASS INDEX: 39.24 KG/M2 | HEART RATE: 87 BPM | SYSTOLIC BLOOD PRESSURE: 138 MMHG | WEIGHT: 250 LBS | TEMPERATURE: 98.4 F

## 2023-05-25 PROCEDURE — 96372 THER/PROPH/DIAG INJ SC/IM: CPT | Mod: 59

## 2023-05-25 RX ORDER — OMALIZUMAB 150 MG/ML
150 INJECTION, SOLUTION SUBCUTANEOUS
Qty: 0 | Refills: 0 | Status: COMPLETED | OUTPATIENT
Start: 2023-05-22

## 2023-06-15 ENCOUNTER — APPOINTMENT (OUTPATIENT)
Dept: ORTHOPEDIC SURGERY | Facility: CLINIC | Age: 73
End: 2023-06-15
Payer: MEDICARE

## 2023-06-15 DIAGNOSIS — M48.061 SPINAL STENOSIS, LUMBAR REGION WITHOUT NEUROGENIC CLAUDICATION: ICD-10-CM

## 2023-06-15 PROCEDURE — 99214 OFFICE O/P EST MOD 30 MIN: CPT

## 2023-06-15 NOTE — DISCUSSION/SUMMARY
[de-identified] : I went over the pathophysiology of the patient's symptoms in great detail with the patient. I am recommending the patient continues to go to physical therapy to obtain increases in their strength and mobility. A prescription was provided. I stressed the importance of weight loss and its benefits to the patient’s joints and overall health. At-home strengthening exercises were discussed and demonstrated with the patient. All of their questions were answered. They understand and consent to the plan. \par \par FU in 4 to 6 weeks

## 2023-06-15 NOTE — ADDENDUM
[FreeTextEntry1] : I, SHARLENEJILLIAN STILES, acted solely as a scribe for Dr. Hardy Henning on this date 06/15/2023.\par \par All medical record entries made by the Scribe were at my, Dr. Hardy Henning, direction and personally dictated by me on 06/15/2023. I have reviewed the chart and agree that the record accurately reflects my personal performance of the history, physical exam, assessment and plan. I have also personally directed, reviewed, and agreed with the chart.

## 2023-06-15 NOTE — PHYSICAL EXAM
[LE] : Sensory: Intact in bilateral lower extremities [de-identified] : Constitutional\par o Appearance : well-nourished, well developed, alert, in no acute distress \par Head and Face\par o Head :\par ¦ Inspection : atraumatic, normocephalic\par o Face :\par ¦ Inspection : no visible rash or discoloration\par Respiratory\par o Respiratory Effort: breathing unlabored \par Neurologic\par o Mental Status Examination :\par ¦ Orientation : alert and oriented X 3\par Psychiatric\par o Mood and Affect: mood normal, affect appropriate\par Cardiovascular\par o Observation/Palpation : - no swelling\par Lymphatic\par o Additional Nodes : No palpable lymph nodes present\par \par Right Upper Extremity \par o Right Shoulder :\par ¦ Inspection/Palpation : no tenderness, swelling or deformities mild anterior capsular tenderness\par ¦ Range of Motion : full flexion and rotation, no crepitance, mild discomfort with cross chest maneuvers \par ¦ Strength : forward elevation, supraspinatus and external rotation at 90° of abduction 4+/5, internal rotation 5/5, external rotation 5/5, biceps/triceps 5/5\par ¦ Stability : no joint instability on provocative testing \par ¦ Tests: Anaya negative, Neer negative, Kai negative, negative drop arm test \par \par Lumbosacral Spine\par o Inspection : no visible rash or discoloration, no keratosis, kyphotic\par o Palpation : no tenderness over the L5/S1 area, no sciatic notch or SI joint tenderness, no paraspinal musculature tenderness \par o Range of Motion : sidebending left and right causes lateral hip discomfort, extension causes lumbar discomfort\par o Muscle Strength : paraspinal muscle strength and tone within normal limits\par o Muscle Tone : paraspinal muscle strength and tone within normal limits\par o Tests: straight leg test negative and EM test negative bilaterally\par  \par Right Lower Extremity\par o Buttock : no tenderness, swelling or deformities\par o Right Hip :\par ¦ Inspection/Palpation : no greater trochanteric or ITB tenderness, no swelling or deformities\par ¦ Range of Motion : full flexion, mild limitation of IR with mild discomfort, no crepitance\par ¦ Stability : joint stability intact\par ¦ Strength : extension, flexion 4-/5, adduction, abduction, internal rotation and external rotation\par ¦ Tests: Samina’s test negative\par \par o Right Knee :\par ¦ Inspection/Palpation : no tenderness to palpation, no swelling\par ¦ Range of Motion : active flexion and extension full and painless, no crepitance\par ¦ Stability : no valgus or varus instability present on provocative testing\par ¦ Strength : flexion and extension 5/5\par ¦ Tests and Signs : negative Anterior Drawer, negative Lachman, negative Cassie's test \par \par \par o Left Hip :\par ¦ Inspection/Palpation : no greater trochanteric or ITB tenderness, no swelling or deformities\par ¦ Range of Motion : full flexion and rotation with mild discomfort, no crepitance\par ¦ Stability : joint stability intact\par ¦ Strength : extension, flexion 4-/5, adduction, abduction, internal rotation and external rotation\par ¦ Tests: Samina’s test negative \par \par o Left Knee :\par ¦ Inspection/Palpation : no tenderness to palpation, no swelling\par ¦ Range of Motion : active flexion and extension full and painless, no crepitance\par ¦ Stability : no valgus or varus instability present on provocative testing\par ¦ Strength : flexion and extension 5/5\par ¦ Tests and Signs : negative Anterior Drawer, negative Lachman, negative Cassie's test \par \par Gait: kyphotic, no significant extremity swelling or lymphedema, good proprioception and balance, normal sensation to light touch, equal leg lengths, no foot drop, very limited core strength, no foot drop,

## 2023-06-15 NOTE — HISTORY OF PRESENT ILLNESS
[de-identified] : 73 year old male presents complaining of low back and bilateral hip pain, left worse than right. His left leg feels weak with difficulty climbing stairs. He denies any buckling or giving out. He is attending PT. \par PMHx of Hodgkin's lymphoma in his back diagnosed in 11/2020, chemotherapy ended in 5/2021 with complete response. He is a diabetic. He is on Effient (prasugrel). \par \par Radiology Results from 6/15/2023:\par o Lumbosacral Spine : AP and lateral views were obtained and revealed diffuse degenerative disc disease with anterior osteophytes consistent with early DISH and foraminal stenosis at L4/L5 and L5/S1. \par o Pelvis : AP pelvis was obtained and revealed moderate arthritis of both hips.\par \par MRI Lumbar Spine done at [facility] [place] on 9/24/2020:\par 1. A mass measuring 3.8 x 3.6 x 3.0 cm is identified centered in the mid left side of the L3 vertebral body that extends posteriorly into the spinal canal causing spinal canal stenosis that is of indeterminate etiology. The images were reviewed today and it appears to be an expansile lesion of L3.\par 2. Subcentimeter lesion lateral superior right side of the sacrum noted, that is of indeterminate etiology.\par 3. At the L2-3, L3-4 and L4-5 levels, a broad-based disc bulge is noted that encroaches the right and left neural foramina.\par 4. At L5-S1 level, grade 1 anterior spondylolisthesis of L5 on S1 and a broad-based disc bulge is noted.

## 2023-06-28 ENCOUNTER — APPOINTMENT (OUTPATIENT)
Dept: ALLERGY | Facility: CLINIC | Age: 73
End: 2023-06-28
Payer: MEDICARE

## 2023-06-28 VITALS
OXYGEN SATURATION: 99 % | DIASTOLIC BLOOD PRESSURE: 58 MMHG | SYSTOLIC BLOOD PRESSURE: 112 MMHG | TEMPERATURE: 99 F | BODY MASS INDEX: 39.24 KG/M2 | HEART RATE: 64 BPM | HEIGHT: 67 IN | RESPIRATION RATE: 14 BRPM | WEIGHT: 250 LBS

## 2023-06-28 PROCEDURE — 96372 THER/PROPH/DIAG INJ SC/IM: CPT | Mod: 59

## 2023-06-28 RX ORDER — OMALIZUMAB 150 MG/ML
150 INJECTION, SOLUTION SUBCUTANEOUS
Qty: 0 | Refills: 0 | Status: COMPLETED | OUTPATIENT
Start: 2023-06-25

## 2023-07-26 ENCOUNTER — APPOINTMENT (OUTPATIENT)
Dept: ORTHOPEDIC SURGERY | Facility: CLINIC | Age: 73
End: 2023-07-26
Payer: MEDICARE

## 2023-07-26 DIAGNOSIS — M16.0 BILATERAL PRIMARY OSTEOARTHRITIS OF HIP: ICD-10-CM

## 2023-07-26 DIAGNOSIS — M47.816 SPONDYLOSIS W/OUT MYELOPATHY OR RADICULOPATHY, LUMBAR REGION: ICD-10-CM

## 2023-07-26 PROCEDURE — 99214 OFFICE O/P EST MOD 30 MIN: CPT

## 2023-07-26 NOTE — HISTORY OF PRESENT ILLNESS
[de-identified] : 73 year old male presents complaining of low back and bilateral hip pain, left worse than right. He notes he was not able to attend PT for two weeks due to being sick. He has minimal complaints of his lower back. He is known to have arthritis in his back. \par PMHx of Hodgkin's lymphoma in his back diagnosed in 11/2020, chemotherapy ended in 5/2021 with complete response. He is a diabetic. \par \par Radiology Results from 6/15/2023:\par o Lumbosacral Spine : AP and lateral views were obtained and revealed diffuse degenerative disc disease with anterior osteophytes consistent with early DISH and foraminal stenosis at L4/L5 and L5/S1. \par o Pelvis : AP pelvis was obtained and revealed moderate arthritis of both hips.\par \par MRI Lumbar Spine done at [facility] on 9/24/2020:\par 1. A mass measuring 3.8 x 3.6 x 3.0 cm is identified centered in the mid left side of the L3 vertebral body that extends posteriorly into the spinal canal causing spinal canal stenosis that is of indeterminate etiology. The images were reviewed today and it appears to be an expansile lesion of L3.\par 2. Subcentimeter lesion lateral superior right side of the sacrum noted, that is of indeterminate etiology.\par 3. At the L2-3, L3-4 and L4-5 levels, a broad-based disc bulge is noted that encroaches the right and left neural foramina.\par 4. At L5-S1 level, grade 1 anterior spondylolisthesis of L5 on S1 and a broad-based disc bulge is noted.

## 2023-07-26 NOTE — PHYSICAL EXAM
[LE] : Sensory: Intact in bilateral lower extremities [de-identified] : Constitutional\par o Appearance : well-nourished, well developed, alert, in no acute distress \par Head and Face\par o Head :\par ¦ Inspection : atraumatic, normocephalic\par o Face :\par ¦ Inspection : no visible rash or discoloration\par Respiratory\par o Respiratory Effort: breathing unlabored \par Neurologic\par o Mental Status Examination :\par ¦ Orientation : alert and oriented X 3\par Psychiatric\par o Mood and Affect: mood normal, affect appropriate\par Cardiovascular\par o Observation/Palpation : - no swelling\par Lymphatic\par o Additional Nodes : No palpable lymph nodes present\par \par Right Upper Extremity \par o Right Shoulder :\par ¦ Inspection/Palpation : no tenderness, swelling or deformities mild anterior capsular tenderness\par ¦ Range of Motion : full flexion and rotation, no crepitance, mild discomfort with cross chest maneuvers \par ¦ Strength : forward elevation, supraspinatus and external rotation at 90° of abduction 4+/5, internal rotation 5/5, external rotation 5/5, biceps/triceps 5/5\par ¦ Stability : no joint instability on provocative testing \par ¦ Tests: Anaya negative, Neer negative, Kai negative, negative drop arm test \par \par Lumbosacral Spine\par o Inspection : no visible rash or discoloration, no keratosis, kyphotic\par o Palpation : no tenderness over the L5/S1 area, no sciatic notch or SI joint tenderness, no paraspinal musculature tenderness \par o Range of Motion : sidebending left and right causes lateral hip discomfort, extension causes lumbar discomfort\par o Muscle Strength : paraspinal muscle strength and tone within normal limits\par o Muscle Tone : paraspinal muscle strength and tone within normal limits\par o Tests: straight leg test negative and EM test negative bilaterally\par  \par Right Lower Extremity\par o Buttock : no tenderness, swelling or deformities\par o Right Hip :\par ¦ Inspection/Palpation : no greater trochanteric or ITB tenderness, no swelling or deformities\par ¦ Range of Motion : full flexion and rotation no crepitance\par ¦ Stability : joint stability intact\par ¦ Strength : extension, flexion 4-/5, adduction, abduction, internal rotation and external rotation\par ¦ Tests: Samina’s test negative\par \par o Right Knee :\par ¦ Inspection/Palpation : no tenderness to palpation, no swelling\par ¦ Range of Motion : active flexion and extension full and painless, no crepitance\par ¦ Stability : no valgus or varus instability present on provocative testing\par ¦ Strength : flexion and extension 5/5\par ¦ Tests and Signs : negative Anterior Drawer, negative Lachman, negative Cassie's test \par \par \par o Left Hip :\par ¦ Inspection/Palpation : no greater trochanteric or ITB tenderness, no swelling or deformities\par ¦ Range of Motion : full flexion and limited rotation rotation with mild discomfort, no crepitance\par ¦ Stability : joint stability intact\par ¦ Strength : extension, flexion 4-/5, adduction, abduction, internal rotation and external rotation\par ¦ Tests: Samina’s test negative \par \par o Left Knee :\par ¦ Inspection/Palpation : no tenderness to palpation, no swelling\par ¦ Range of Motion : active flexion and extension full and painless, no crepitance\par ¦ Stability : no valgus or varus instability present on provocative testing\par ¦ Strength : flexion and extension 5/5\par ¦ Tests and Signs : negative Anterior Drawer, negative Lachman, negative Cassie's test \par \par Gait: kyphotic, no significant extremity swelling or lymphedema, good proprioception and balance, normal sensation to light touch, equal leg lengths, no foot drop, very limited  core strength, no foot drop,

## 2023-07-26 NOTE — DISCUSSION/SUMMARY
[de-identified] : I went over the pathophysiology of the patient's symptoms in great detail with the patient. At this time, he will start a course of physical therapy for strengthening and flexibility. A prescription was provided. I stressed the importance of weight loss and its benefits to the patient’s joints and overall health. \par \par All of their questions were answered. They understand and consent to the plan. \par \par FU in 6 weeks. After undergoing PT.

## 2023-08-02 ENCOUNTER — APPOINTMENT (OUTPATIENT)
Dept: ALLERGY | Facility: CLINIC | Age: 73
End: 2023-08-02
Payer: MEDICARE

## 2023-08-02 VITALS
HEART RATE: 79 BPM | SYSTOLIC BLOOD PRESSURE: 136 MMHG | DIASTOLIC BLOOD PRESSURE: 60 MMHG | TEMPERATURE: 99.2 F | OXYGEN SATURATION: 98 %

## 2023-08-02 PROCEDURE — 96372 THER/PROPH/DIAG INJ SC/IM: CPT

## 2023-08-02 RX ORDER — OMALIZUMAB 150 MG/ML
150 INJECTION, SOLUTION SUBCUTANEOUS
Qty: 0 | Refills: 0 | Status: COMPLETED | OUTPATIENT
Start: 2023-08-01

## 2023-09-06 ENCOUNTER — NON-APPOINTMENT (OUTPATIENT)
Age: 73
End: 2023-09-06

## 2023-09-07 ENCOUNTER — APPOINTMENT (OUTPATIENT)
Dept: ALLERGY | Facility: CLINIC | Age: 73
End: 2023-09-07

## 2023-09-11 ENCOUNTER — APPOINTMENT (OUTPATIENT)
Dept: ORTHOPEDIC SURGERY | Facility: CLINIC | Age: 73
End: 2023-09-11

## 2023-09-18 ENCOUNTER — APPOINTMENT (OUTPATIENT)
Dept: ALLERGY | Facility: CLINIC | Age: 73
End: 2023-09-18
Payer: MEDICARE

## 2023-09-18 VITALS — OXYGEN SATURATION: 97 % | HEART RATE: 84 BPM | SYSTOLIC BLOOD PRESSURE: 132 MMHG | DIASTOLIC BLOOD PRESSURE: 60 MMHG

## 2023-09-18 PROCEDURE — 96372 THER/PROPH/DIAG INJ SC/IM: CPT | Mod: 59

## 2023-09-18 RX ORDER — PREDNISONE 5 MG/1
5 TABLET ORAL
Qty: 20 | Refills: 0 | Status: ACTIVE | COMMUNITY
Start: 2022-10-03 | End: 1900-01-01

## 2023-09-18 RX ORDER — OMALIZUMAB 150 MG/ML
150 INJECTION, SOLUTION SUBCUTANEOUS
Qty: 0 | Refills: 0 | Status: COMPLETED | OUTPATIENT
Start: 2023-09-17

## 2023-10-30 ENCOUNTER — APPOINTMENT (OUTPATIENT)
Dept: ALLERGY | Facility: CLINIC | Age: 73
End: 2023-10-30
Payer: MEDICARE

## 2023-10-30 VITALS
TEMPERATURE: 98.5 F | DIASTOLIC BLOOD PRESSURE: 72 MMHG | HEART RATE: 80 BPM | SYSTOLIC BLOOD PRESSURE: 122 MMHG | OXYGEN SATURATION: 98 %

## 2023-10-30 PROCEDURE — 96372 THER/PROPH/DIAG INJ SC/IM: CPT

## 2023-10-30 RX ORDER — OMALIZUMAB 150 MG/ML
150 INJECTION, SOLUTION SUBCUTANEOUS
Qty: 0 | Refills: 0 | Status: COMPLETED | OUTPATIENT
Start: 2023-10-29

## 2023-11-22 ENCOUNTER — NON-APPOINTMENT (OUTPATIENT)
Age: 73
End: 2023-11-22

## 2023-11-27 ENCOUNTER — APPOINTMENT (OUTPATIENT)
Dept: ALLERGY | Facility: CLINIC | Age: 73
End: 2023-11-27
Payer: MEDICARE

## 2023-11-27 VITALS
HEART RATE: 76 BPM | HEIGHT: 67 IN | WEIGHT: 250 LBS | DIASTOLIC BLOOD PRESSURE: 66 MMHG | TEMPERATURE: 98.6 F | OXYGEN SATURATION: 96 % | BODY MASS INDEX: 39.24 KG/M2 | SYSTOLIC BLOOD PRESSURE: 132 MMHG

## 2023-11-27 PROCEDURE — 96372 THER/PROPH/DIAG INJ SC/IM: CPT | Mod: 59

## 2023-11-27 PROCEDURE — 99214 OFFICE O/P EST MOD 30 MIN: CPT | Mod: 25

## 2023-11-27 RX ORDER — OMALIZUMAB 150 MG/ML
150 INJECTION, SOLUTION SUBCUTANEOUS
Qty: 0 | Refills: 0 | Status: COMPLETED | OUTPATIENT
Start: 2023-11-26

## 2023-12-04 RX ORDER — HYDROXYZINE HYDROCHLORIDE 10 MG/1
10 TABLET ORAL
Qty: 360 | Refills: 0 | Status: ACTIVE | COMMUNITY
Start: 2020-03-11 | End: 1900-01-01

## 2023-12-04 RX ORDER — EPINEPHRINE 0.3 MG/.3ML
0.3 INJECTION INTRAMUSCULAR
Qty: 2 | Refills: 0 | Status: ACTIVE | COMMUNITY
Start: 2020-11-30 | End: 1900-01-01

## 2023-12-13 ENCOUNTER — NON-APPOINTMENT (OUTPATIENT)
Age: 73
End: 2023-12-13

## 2023-12-20 ENCOUNTER — APPOINTMENT (OUTPATIENT)
Dept: ALLERGY | Facility: CLINIC | Age: 73
End: 2023-12-20
Payer: MEDICARE

## 2023-12-20 VITALS
TEMPERATURE: 98.1 F | HEART RATE: 91 BPM | BODY MASS INDEX: 39.24 KG/M2 | WEIGHT: 250 LBS | DIASTOLIC BLOOD PRESSURE: 74 MMHG | HEIGHT: 67 IN | OXYGEN SATURATION: 97 % | SYSTOLIC BLOOD PRESSURE: 138 MMHG

## 2023-12-20 PROCEDURE — 96372 THER/PROPH/DIAG INJ SC/IM: CPT

## 2023-12-21 RX ORDER — OMALIZUMAB 150 MG/ML
150 INJECTION, SOLUTION SUBCUTANEOUS
Qty: 0 | Refills: 0 | Status: COMPLETED | OUTPATIENT
Start: 2023-12-17

## 2024-01-17 ENCOUNTER — APPOINTMENT (OUTPATIENT)
Dept: ALLERGY | Facility: CLINIC | Age: 74
End: 2024-01-17
Payer: MEDICARE

## 2024-01-17 VITALS
HEART RATE: 83 BPM | BODY MASS INDEX: 39.24 KG/M2 | HEIGHT: 67 IN | DIASTOLIC BLOOD PRESSURE: 76 MMHG | SYSTOLIC BLOOD PRESSURE: 140 MMHG | WEIGHT: 250 LBS | OXYGEN SATURATION: 98 % | TEMPERATURE: 97.7 F

## 2024-01-17 PROCEDURE — 96372 THER/PROPH/DIAG INJ SC/IM: CPT | Mod: 59

## 2024-01-17 RX ORDER — OMALIZUMAB 150 MG/ML
150 INJECTION, SOLUTION SUBCUTANEOUS
Qty: 0 | Refills: 0 | Status: COMPLETED | OUTPATIENT
Start: 2024-01-14

## 2024-02-14 ENCOUNTER — APPOINTMENT (OUTPATIENT)
Dept: ALLERGY | Facility: CLINIC | Age: 74
End: 2024-02-14
Payer: MEDICARE

## 2024-02-14 VITALS
DIASTOLIC BLOOD PRESSURE: 66 MMHG | WEIGHT: 250 LBS | OXYGEN SATURATION: 99 % | BODY MASS INDEX: 39.24 KG/M2 | HEIGHT: 67 IN | SYSTOLIC BLOOD PRESSURE: 132 MMHG | HEART RATE: 84 BPM | TEMPERATURE: 98.5 F

## 2024-02-14 PROCEDURE — 96372 THER/PROPH/DIAG INJ SC/IM: CPT

## 2024-02-14 RX ORDER — OMALIZUMAB 150 MG/ML
150 INJECTION, SOLUTION SUBCUTANEOUS
Qty: 0 | Refills: 0 | Status: COMPLETED | OUTPATIENT
Start: 2024-02-12

## 2024-03-13 ENCOUNTER — APPOINTMENT (OUTPATIENT)
Dept: ALLERGY | Facility: CLINIC | Age: 74
End: 2024-03-13
Payer: MEDICARE

## 2024-03-13 VITALS
DIASTOLIC BLOOD PRESSURE: 68 MMHG | TEMPERATURE: 97.9 F | OXYGEN SATURATION: 98 % | HEART RATE: 76 BPM | SYSTOLIC BLOOD PRESSURE: 128 MMHG | WEIGHT: 250 LBS | BODY MASS INDEX: 39.24 KG/M2 | HEIGHT: 67 IN

## 2024-03-13 PROCEDURE — 96372 THER/PROPH/DIAG INJ SC/IM: CPT | Mod: 59

## 2024-03-13 RX ORDER — OMALIZUMAB 150 MG/ML
150 INJECTION, SOLUTION SUBCUTANEOUS
Qty: 0 | Refills: 0 | Status: COMPLETED | OUTPATIENT
Start: 2024-03-10

## 2024-03-13 RX ORDER — PREDNISONE 5 MG/1
5 TABLET ORAL
Qty: 60 | Refills: 0 | Status: ACTIVE | COMMUNITY
Start: 2024-03-13 | End: 1900-01-01

## 2024-03-15 ENCOUNTER — RX RENEWAL (OUTPATIENT)
Age: 74
End: 2024-03-15

## 2024-03-15 RX ORDER — PREDNISONE 5 MG/1
5 TABLET ORAL
Qty: 20 | Refills: 0 | Status: ACTIVE | COMMUNITY
Start: 2020-07-21 | End: 1900-01-01

## 2024-04-16 ENCOUNTER — APPOINTMENT (OUTPATIENT)
Dept: ALLERGY | Facility: CLINIC | Age: 74
End: 2024-04-16
Payer: MEDICARE

## 2024-04-16 ENCOUNTER — APPOINTMENT (OUTPATIENT)
Dept: ALLERGY | Facility: CLINIC | Age: 74
End: 2024-04-16

## 2024-04-16 VITALS
TEMPERATURE: 98.2 F | OXYGEN SATURATION: 98 % | HEART RATE: 91 BPM | DIASTOLIC BLOOD PRESSURE: 70 MMHG | SYSTOLIC BLOOD PRESSURE: 126 MMHG

## 2024-04-16 PROCEDURE — 96372 THER/PROPH/DIAG INJ SC/IM: CPT | Mod: 59

## 2024-04-16 RX ORDER — OMALIZUMAB 150 MG/ML
150 INJECTION, SOLUTION SUBCUTANEOUS
Qty: 0 | Refills: 0 | Status: COMPLETED | OUTPATIENT
Start: 2024-04-14

## 2024-04-16 NOTE — HISTORY OF PRESENT ILLNESS
[de-identified] : Patient taking Allegra x 2 in AM - Xyzal x 1 in PM - hydroxyzine 20 mg QHS - Xolair every 4 weeks.   Awake

## 2024-05-14 ENCOUNTER — APPOINTMENT (OUTPATIENT)
Dept: ALLERGY | Facility: CLINIC | Age: 74
End: 2024-05-14
Payer: MEDICARE

## 2024-05-14 VITALS
BODY MASS INDEX: 39.24 KG/M2 | OXYGEN SATURATION: 98 % | HEART RATE: 80 BPM | HEIGHT: 67 IN | TEMPERATURE: 98.2 F | WEIGHT: 250 LBS | SYSTOLIC BLOOD PRESSURE: 120 MMHG | DIASTOLIC BLOOD PRESSURE: 66 MMHG

## 2024-05-14 PROCEDURE — 96372 THER/PROPH/DIAG INJ SC/IM: CPT

## 2024-05-14 RX ORDER — OMALIZUMAB 150 MG/ML
150 INJECTION, SOLUTION SUBCUTANEOUS
Qty: 0 | Refills: 0 | Status: COMPLETED | OUTPATIENT
Start: 2024-05-12

## 2024-06-10 DIAGNOSIS — L50.1 IDIOPATHIC URTICARIA: ICD-10-CM

## 2024-06-13 ENCOUNTER — APPOINTMENT (OUTPATIENT)
Dept: ALLERGY | Facility: CLINIC | Age: 74
End: 2024-06-13
Payer: MEDICARE

## 2024-06-13 VITALS
HEART RATE: 85 BPM | DIASTOLIC BLOOD PRESSURE: 64 MMHG | WEIGHT: 250 LBS | HEIGHT: 67 IN | TEMPERATURE: 98.1 F | SYSTOLIC BLOOD PRESSURE: 120 MMHG | OXYGEN SATURATION: 98 % | BODY MASS INDEX: 39.24 KG/M2

## 2024-06-13 PROCEDURE — 96372 THER/PROPH/DIAG INJ SC/IM: CPT | Mod: 59

## 2024-06-13 RX ORDER — OMALIZUMAB 150 MG/ML
150 INJECTION, SOLUTION SUBCUTANEOUS
Qty: 0 | Refills: 0 | Status: COMPLETED | OUTPATIENT
Start: 2024-06-10

## 2024-06-14 RX ORDER — BETAMETHASONE DIPROPIONATE 0.5 MG/G
0.05 CREAM, AUGMENTED TOPICAL TWICE DAILY
Qty: 80 | Refills: 0 | Status: ACTIVE | COMMUNITY
Start: 2022-10-03 | End: 1900-01-01

## 2024-07-11 ENCOUNTER — APPOINTMENT (OUTPATIENT)
Dept: ALLERGY | Facility: CLINIC | Age: 74
End: 2024-07-11
Payer: MEDICARE

## 2024-07-11 VITALS
TEMPERATURE: 98.1 F | SYSTOLIC BLOOD PRESSURE: 116 MMHG | OXYGEN SATURATION: 98 % | HEIGHT: 67 IN | HEART RATE: 62 BPM | WEIGHT: 250 LBS | BODY MASS INDEX: 39.24 KG/M2 | DIASTOLIC BLOOD PRESSURE: 60 MMHG

## 2024-07-11 PROCEDURE — 96372 THER/PROPH/DIAG INJ SC/IM: CPT

## 2024-08-13 ENCOUNTER — APPOINTMENT (OUTPATIENT)
Dept: ALLERGY | Facility: CLINIC | Age: 74
End: 2024-08-13
Payer: MEDICARE

## 2024-08-13 VITALS
HEIGHT: 67 IN | BODY MASS INDEX: 39.24 KG/M2 | SYSTOLIC BLOOD PRESSURE: 124 MMHG | TEMPERATURE: 98.2 F | OXYGEN SATURATION: 96 % | HEART RATE: 82 BPM | WEIGHT: 250 LBS | DIASTOLIC BLOOD PRESSURE: 64 MMHG

## 2024-08-13 PROCEDURE — 96372 THER/PROPH/DIAG INJ SC/IM: CPT

## 2024-09-03 ENCOUNTER — NON-APPOINTMENT (OUTPATIENT)
Age: 74
End: 2024-09-03

## 2024-09-04 ENCOUNTER — APPOINTMENT (OUTPATIENT)
Dept: ALLERGY | Facility: CLINIC | Age: 74
End: 2024-09-04
Payer: MEDICARE

## 2024-09-04 VITALS
TEMPERATURE: 98.96 F | HEART RATE: 83 BPM | WEIGHT: 250 LBS | SYSTOLIC BLOOD PRESSURE: 144 MMHG | HEIGHT: 67 IN | DIASTOLIC BLOOD PRESSURE: 68 MMHG | BODY MASS INDEX: 39.24 KG/M2 | OXYGEN SATURATION: 98 %

## 2024-09-04 PROCEDURE — 99214 OFFICE O/P EST MOD 30 MIN: CPT

## 2024-09-04 RX ORDER — PREDNISONE 5 MG/1
5 TABLET ORAL
Qty: 30 | Refills: 0 | Status: ACTIVE | COMMUNITY
Start: 2024-09-04 | End: 1900-01-01

## 2024-09-04 NOTE — ASSESSMENT
[FreeTextEntry1] : Chronic idiopathic urticaria/angioedema:  Prednisone 10 mg QD x 3 D Prednisone 5 mg QD x 3 D Increase Xolair 300 mg every 2 weeks x 3 shots than every 3 weeks if doing well.  Allegra x 2 in AM Zyrtec x 1 in PM

## 2024-09-04 NOTE — HISTORY OF PRESENT ILLNESS
[de-identified] : Patient reports last Friday he had some mild hives and he took prednisone 5 mg he was driving home - tongue was swollen and sore throat - he returned home and took Benadryl x 2 and prednisone - he went to ER and his symptoms was improved - prednisone 40 mg and EpiPen - observed thru the next morning - patient treated with prednisone 40 mg daily - in addition he takes Allegra 180 mg x 2 in AM and Zyrtec 10 mg.    He has had kidney stones with sepsis in May - blood pressure low and he was taken off of irbesartan.    No recent antibiotics.

## 2024-09-04 NOTE — PHYSICAL EXAM
[Alert] : alert [Well Nourished] : well nourished [No Acute Distress] : no acute distress [Well Developed] : well developed [Normal Lips/Tongue] : the lips and tongue were normal [Normal Tonsils] : normal tonsils [No Neck Mass] : no neck mass was observed [No LAD] : no lymphadenopathy [Normal Rate and Effort] : normal respiratory rhythm and effort [No Crackles] : no crackles [No Retractions] : no retractions [Wheezing] : no wheezing was heard [Normal Rate] : heart rate was normal  [Normal S1, S2] : normal S1 and S2 [Regular Rhythm] : with a regular rhythm [Normal Cervical Lymph Nodes] : cervical [Skin Intact] : skin intact  [No Rash] : no rash [Normal Mood] : mood was normal [Normal Affect] : affect was normal [Judgment and Insight Age Appropriate] : judgement and insight is age appropriate

## 2024-09-06 ENCOUNTER — RX RENEWAL (OUTPATIENT)
Age: 74
End: 2024-09-06

## 2024-09-06 RX ORDER — FAMOTIDINE 20 MG/1
20 TABLET, FILM COATED ORAL
Qty: 180 | Refills: 0 | Status: ACTIVE | COMMUNITY
Start: 2024-09-04 | End: 1900-01-01

## 2024-09-10 ENCOUNTER — APPOINTMENT (OUTPATIENT)
Dept: ALLERGY | Facility: CLINIC | Age: 74
End: 2024-09-10
Payer: MEDICARE

## 2024-09-10 VITALS
WEIGHT: 250 LBS | HEIGHT: 67 IN | BODY MASS INDEX: 39.24 KG/M2 | HEART RATE: 75 BPM | OXYGEN SATURATION: 99 % | TEMPERATURE: 98.1 F | SYSTOLIC BLOOD PRESSURE: 130 MMHG | DIASTOLIC BLOOD PRESSURE: 70 MMHG

## 2024-09-10 PROCEDURE — 96372 THER/PROPH/DIAG INJ SC/IM: CPT

## 2024-09-23 ENCOUNTER — APPOINTMENT (OUTPATIENT)
Dept: ALLERGY | Facility: CLINIC | Age: 74
End: 2024-09-23
Payer: MEDICARE

## 2024-09-23 PROCEDURE — 96372 THER/PROPH/DIAG INJ SC/IM: CPT

## 2024-09-30 DIAGNOSIS — L50.1 IDIOPATHIC URTICARIA: ICD-10-CM

## 2024-09-30 RX ORDER — OMALIZUMAB 150 MG/ML
150 INJECTION, SOLUTION SUBCUTANEOUS
Qty: 0 | Refills: 0 | Status: COMPLETED | OUTPATIENT
Start: 2024-09-30

## 2024-10-01 ENCOUNTER — APPOINTMENT (OUTPATIENT)
Dept: ORTHOPEDIC SURGERY | Facility: CLINIC | Age: 74
End: 2024-10-01
Payer: MEDICARE

## 2024-10-01 VITALS — HEIGHT: 67 IN | BODY MASS INDEX: 36.1 KG/M2 | WEIGHT: 230 LBS

## 2024-10-01 DIAGNOSIS — M16.0 BILATERAL PRIMARY OSTEOARTHRITIS OF HIP: ICD-10-CM

## 2024-10-01 DIAGNOSIS — M48.061 SPINAL STENOSIS, LUMBAR REGION WITHOUT NEUROGENIC CLAUDICATION: ICD-10-CM

## 2024-10-01 DIAGNOSIS — M47.816 SPONDYLOSIS W/OUT MYELOPATHY OR RADICULOPATHY, LUMBAR REGION: ICD-10-CM

## 2024-10-01 PROCEDURE — 99214 OFFICE O/P EST MOD 30 MIN: CPT

## 2024-10-01 NOTE — HISTORY OF PRESENT ILLNESS
[de-identified] : 74-year-old male presents complaining of lower back pain and right groin pain. He notes he aggravated his back 3 weeks ago after doing the leg press. He notes his lower back pain is 75% better at this time. He is aware he did too much. Patient denies that he has any numbness or tingling. He has limited rotation of his lower back. He notes he has been going to physical therapy. He notes he is working on losing weight. PMHx of Hodgkin's lymphoma in his back diagnosed in 11/2020, chemotherapy ended in 5/2021 with complete response. He is a diabetic.   Radiology Results from 6/15/2023: o Lumbosacral Spine : AP and lateral views were obtained and revealed diffuse degenerative disc disease with anterior osteophytes consistent with early DISH and foraminal stenosis at L4/L5 and L5/S1.  o Pelvis : AP pelvis was obtained and revealed moderate arthritis of both hips.  MRI Lumbar Spine done at [facility] on 9/24/2020: 1. A mass measuring 3.8 x 3.6 x 3.0 cm is identified centered in the mid left side of the L3 vertebral body that extends posteriorly into the spinal canal causing spinal canal stenosis that is of indeterminate etiology. The images were reviewed today and it appears to be an expansile lesion of L3. 2. Subcentimeter lesion lateral superior right side of the sacrum noted, that is of indeterminate etiology. 3. At the L2-3, L3-4 and L4-5 levels, a broad-based disc bulge is noted that encroaches the right and left neural foramina. 4. At L5-S1 level, grade 1 anterior spondylolisthesis of L5 on S1 and a broad-based disc bulge is noted.

## 2024-10-01 NOTE — ADDENDUM
[FreeTextEntry1] : I, SHARLENE STILES, acted solely as a scribe for Dr. Hardy Henning on this date 10/01/2024.  All medical record entries made by the Scribe were at my, Dr. Hardy Henning, direction and personally dictated by me on 10/01/2024. I have reviewed the chart and agree that the record accurately reflects my personal performance of the history, physical exam, assessment and plan. I have also personally directed, reviewed, and agreed with the chart.

## 2024-10-01 NOTE — PHYSICAL EXAM
[LE] : Sensory: Intact in bilateral lower extremities [de-identified] : Constitutional o Appearance : well-nourished, well developed, alert, in no acute distress  Head and Face o Head :  Inspection : atraumatic, normocephalic o Face :  Inspection : no visible rash or discoloration Respiratory o Respiratory Effort: breathing unlabored  Neurologic o Mental Status Examination :  Orientation : alert and oriented X 3 Psychiatric o Mood and Affect: mood normal, affect appropriate Cardiovascular o Observation/Palpation : - no swelling Lymphatic o Additional Nodes : No palpable lymph nodes present  Right Upper Extremity  o Right Shoulder :  Inspection/Palpation : no tenderness, swelling or deformities mild anterior capsular tenderness  Range of Motion : full flexion and rotation, no crepitance, mild discomfort with cross chest maneuvers   Strength : forward elevation, supraspinatus and external rotation at 90 of abduction 4+/5, internal rotation 5/5, external rotation 5/5, biceps/triceps 5/5  Stability : no joint instability on provocative testing   Tests: Anaya negative, Neer negative, Kai negative, negative drop arm test   Lumbosacral Spine o Inspection : no visible rash or discoloration, no keratosis, kyphotic o Palpation : no tenderness over the L5/S1 area, no sciatic notch or SI joint tenderness, no paraspinal musculature tenderness  o Range of Motion : sidebending left and right causes lateral hip discomfort, extension causes lumbar discomfort, rotation to the right is worse  o Muscle Strength : paraspinal muscle strength and tone within normal limits o Muscle Tone : paraspinal muscle strength and tone within normal limits o Tests: straight leg test negative and EM test negative bilaterally   Right Lower Extremity o Buttock : no tenderness, swelling or deformities o Right Hip :  Inspection/Palpation : no greater trochanteric or ITB tenderness, no swelling or deformities  Range of Motion : full flexion and rotation no crepitance  Stability : joint stability intact  Strength : extension, flexion 4-/5, adduction, abduction, internal rotation and external rotation  Tests: Samina's test negative  o Right Knee :  Inspection/Palpation : no tenderness to palpation, no swelling  Range of Motion : active flexion and extension full and painless, no crepitance  Stability : no valgus or varus instability present on provocative testing  Strength : flexion and extension 5/5  Tests and Signs : negative Anterior Drawer, negative Lachman, negative Cassie's test    o Left Hip :  Inspection/Palpation : no greater trochanteric or ITB tenderness, no swelling or deformities  Range of Motion : full flexion and limited rotation rotation with mild discomfort, no crepitance  Stability : joint stability intact  Strength : extension, flexion 4-/5, adduction, abduction, internal rotation and external rotation 3+/5   Tests: Samina's test negative   o Left Knee :  Inspection/Palpation : no tenderness to palpation, no swelling  Range of Motion : active flexion and extension full and painless, no crepitance  Stability : no valgus or varus instability present on provocative testing  Strength : flexion and extension 5/5  Tests and Signs : negative Anterior Drawer, negative Lachman, negative Cassie's test   Gait: kyphotic, no significant extremity swelling or lymphedema, good proprioception and balance, normal sensation to light touch, equal leg lengths, no foot drop, very limited  core strength, no foot drop,

## 2024-10-01 NOTE — DISCUSSION/SUMMARY
[de-identified] : I discussed the underlying pathophysiology of the patient's condition in great detail with the patient. I went over the patient's x-rays with them in great detail. At-home strengthening exercises were discussed and demonstrated with the patient. He should focus on light weight and high repetition exercises. At this time, he will start a course of physical therapy for strengthening and flexibility. A prescription was provided. We discussed the use of ice, Tylenol and anti-inflammatories to relieve pain. I stressed the importance of weight loss and its benefits to the patients joints and overall health. I warned him to be very careful because a fall could result in serious consequences.   All of their questions were answered. They understand and consent to the plan.   FU in 4-6 weeks.

## 2024-10-10 ENCOUNTER — APPOINTMENT (OUTPATIENT)
Dept: ALLERGY | Facility: CLINIC | Age: 74
End: 2024-10-10
Payer: MEDICARE

## 2024-10-10 PROCEDURE — 96372 THER/PROPH/DIAG INJ SC/IM: CPT

## 2024-10-10 RX ORDER — OMALIZUMAB 150 MG/ML
150 INJECTION, SOLUTION SUBCUTANEOUS
Qty: 0 | Refills: 0 | Status: COMPLETED | OUTPATIENT
Start: 2024-10-07

## 2024-10-30 ENCOUNTER — APPOINTMENT (OUTPATIENT)
Dept: ALLERGY | Facility: CLINIC | Age: 74
End: 2024-10-30
Payer: MEDICARE

## 2024-10-30 DIAGNOSIS — L50.1 IDIOPATHIC URTICARIA: ICD-10-CM

## 2024-10-30 PROCEDURE — 96372 THER/PROPH/DIAG INJ SC/IM: CPT

## 2024-10-30 RX ORDER — OMALIZUMAB 150 MG/ML
150 INJECTION, SOLUTION SUBCUTANEOUS
Qty: 0 | Refills: 0 | Status: COMPLETED | OUTPATIENT
Start: 2024-10-30

## 2024-11-19 ENCOUNTER — APPOINTMENT (OUTPATIENT)
Dept: ORTHOPEDIC SURGERY | Facility: CLINIC | Age: 74
End: 2024-11-19

## 2024-11-20 ENCOUNTER — RX RENEWAL (OUTPATIENT)
Age: 74
End: 2024-11-20

## 2024-11-21 ENCOUNTER — APPOINTMENT (OUTPATIENT)
Dept: ALLERGY | Facility: CLINIC | Age: 74
End: 2024-11-21
Payer: MEDICARE

## 2024-11-21 VITALS
TEMPERATURE: 98.2 F | OXYGEN SATURATION: 98 % | SYSTOLIC BLOOD PRESSURE: 126 MMHG | DIASTOLIC BLOOD PRESSURE: 64 MMHG | HEART RATE: 87 BPM

## 2024-11-21 PROCEDURE — 96372 THER/PROPH/DIAG INJ SC/IM: CPT

## 2024-11-21 PROCEDURE — 99214 OFFICE O/P EST MOD 30 MIN: CPT | Mod: 25

## 2024-12-03 ENCOUNTER — RX RENEWAL (OUTPATIENT)
Age: 74
End: 2024-12-03

## 2024-12-18 ENCOUNTER — NON-APPOINTMENT (OUTPATIENT)
Age: 74
End: 2024-12-18

## 2024-12-18 ENCOUNTER — APPOINTMENT (OUTPATIENT)
Dept: ALLERGY | Facility: CLINIC | Age: 74
End: 2024-12-18
Payer: MEDICARE

## 2024-12-18 VITALS
SYSTOLIC BLOOD PRESSURE: 120 MMHG | DIASTOLIC BLOOD PRESSURE: 68 MMHG | HEIGHT: 67 IN | HEART RATE: 63 BPM | OXYGEN SATURATION: 98 % | BODY MASS INDEX: 36.1 KG/M2 | WEIGHT: 230 LBS | TEMPERATURE: 98 F

## 2024-12-18 PROCEDURE — 96372 THER/PROPH/DIAG INJ SC/IM: CPT

## 2024-12-27 ENCOUNTER — DOCTOR'S OFFICE (OUTPATIENT)
Facility: LOCATION | Age: 74
Setting detail: OPHTHALMOLOGY
End: 2024-12-27
Payer: MEDICARE

## 2024-12-27 DIAGNOSIS — H25.13: ICD-10-CM

## 2024-12-27 DIAGNOSIS — E10.3311: ICD-10-CM

## 2024-12-27 DIAGNOSIS — H40.013: ICD-10-CM

## 2024-12-27 DIAGNOSIS — E10.3392: ICD-10-CM

## 2024-12-27 DIAGNOSIS — H43.813: ICD-10-CM

## 2024-12-27 DIAGNOSIS — H35.033: ICD-10-CM

## 2024-12-27 DIAGNOSIS — H35.371: ICD-10-CM

## 2024-12-27 PROCEDURE — 92201 OPSCPY EXTND RTA DRAW UNI/BI: CPT | Performed by: OPHTHALMOLOGY

## 2024-12-27 PROCEDURE — 92235 FLUORESCEIN ANGRPH MLTIFRAME: CPT | Performed by: OPHTHALMOLOGY

## 2024-12-27 PROCEDURE — 92014 COMPRE OPH EXAM EST PT 1/>: CPT | Performed by: OPHTHALMOLOGY

## 2024-12-27 PROCEDURE — 92134 CPTRZ OPH DX IMG PST SGM RTA: CPT | Performed by: OPHTHALMOLOGY

## 2024-12-27 ASSESSMENT — VISUAL ACUITY
OD_BCVA: 20/25-1
OS_BCVA: 20/25-1

## 2024-12-27 ASSESSMENT — CONFRONTATIONAL VISUAL FIELD TEST (CVF)
OS_FINDINGS: FULL
OD_FINDINGS: FULL

## 2025-01-11 DIAGNOSIS — L50.1 IDIOPATHIC URTICARIA: ICD-10-CM

## 2025-01-15 ENCOUNTER — APPOINTMENT (OUTPATIENT)
Dept: ALLERGY | Facility: CLINIC | Age: 75
End: 2025-01-15
Payer: MEDICARE

## 2025-01-15 PROCEDURE — 96372 THER/PROPH/DIAG INJ SC/IM: CPT

## 2025-01-15 RX ORDER — OMALIZUMAB 150 MG/ML
150 INJECTION, SOLUTION SUBCUTANEOUS
Qty: 0 | Refills: 0 | Status: COMPLETED | OUTPATIENT
Start: 2025-01-11

## 2025-01-30 ENCOUNTER — APPOINTMENT (OUTPATIENT)
Dept: ORTHOPEDIC SURGERY | Facility: CLINIC | Age: 75
End: 2025-01-30
Payer: MEDICARE

## 2025-01-30 VITALS — WEIGHT: 230 LBS | HEIGHT: 67 IN | BODY MASS INDEX: 36.1 KG/M2

## 2025-01-30 DIAGNOSIS — M47.812 SPONDYLOSIS W/OUT MYELOPATHY OR RADICULOPATHY, CERVICAL REGION: ICD-10-CM

## 2025-01-30 PROCEDURE — 72040 X-RAY EXAM NECK SPINE 2-3 VW: CPT

## 2025-01-30 PROCEDURE — 99213 OFFICE O/P EST LOW 20 MIN: CPT

## 2025-02-12 ENCOUNTER — APPOINTMENT (OUTPATIENT)
Dept: ALLERGY | Facility: CLINIC | Age: 75
End: 2025-02-12
Payer: MEDICARE

## 2025-02-12 VITALS
TEMPERATURE: 98.1 F | HEART RATE: 75 BPM | OXYGEN SATURATION: 97 % | DIASTOLIC BLOOD PRESSURE: 68 MMHG | SYSTOLIC BLOOD PRESSURE: 126 MMHG

## 2025-02-12 PROCEDURE — 96372 THER/PROPH/DIAG INJ SC/IM: CPT

## 2025-02-13 RX ORDER — EPINEPHRINE 0.3 MG/.3ML
0.3 INJECTION INTRAMUSCULAR
Qty: 1 | Refills: 0 | Status: ACTIVE | COMMUNITY
Start: 2025-02-12 | End: 1900-01-01

## 2025-03-10 DIAGNOSIS — L50.1 IDIOPATHIC URTICARIA: ICD-10-CM

## 2025-03-12 ENCOUNTER — APPOINTMENT (OUTPATIENT)
Dept: ALLERGY | Facility: CLINIC | Age: 75
End: 2025-03-12
Payer: MEDICARE

## 2025-03-12 VITALS
HEART RATE: 82 BPM | OXYGEN SATURATION: 98 % | DIASTOLIC BLOOD PRESSURE: 74 MMHG | TEMPERATURE: 97.8 F | SYSTOLIC BLOOD PRESSURE: 138 MMHG

## 2025-03-12 PROCEDURE — 96372 THER/PROPH/DIAG INJ SC/IM: CPT

## 2025-03-12 PROCEDURE — 99213 OFFICE O/P EST LOW 20 MIN: CPT | Mod: 25

## 2025-03-12 RX ORDER — OMALIZUMAB 150 MG/ML
150 INJECTION, SOLUTION SUBCUTANEOUS
Qty: 0 | Refills: 0 | Status: COMPLETED | OUTPATIENT
Start: 2025-03-10

## 2025-04-01 ENCOUNTER — DOCTOR'S OFFICE (OUTPATIENT)
Facility: LOCATION | Age: 75
Setting detail: OPHTHALMOLOGY
End: 2025-04-01
Payer: MEDICARE

## 2025-04-01 DIAGNOSIS — H35.033: ICD-10-CM

## 2025-04-01 DIAGNOSIS — E10.3311: ICD-10-CM

## 2025-04-01 DIAGNOSIS — H40.013: ICD-10-CM

## 2025-04-01 DIAGNOSIS — H25.13: ICD-10-CM

## 2025-04-01 DIAGNOSIS — E10.3392: ICD-10-CM

## 2025-04-01 DIAGNOSIS — H43.813: ICD-10-CM

## 2025-04-01 DIAGNOSIS — H35.373: ICD-10-CM

## 2025-04-01 PROCEDURE — 92134 CPTRZ OPH DX IMG PST SGM RTA: CPT | Performed by: OPHTHALMOLOGY

## 2025-04-01 PROCEDURE — 99213 OFFICE O/P EST LOW 20 MIN: CPT | Performed by: OPHTHALMOLOGY

## 2025-04-01 PROCEDURE — 92202 OPSCPY EXTND ON/MAC DRAW: CPT | Performed by: OPHTHALMOLOGY

## 2025-04-01 ASSESSMENT — CONFRONTATIONAL VISUAL FIELD TEST (CVF)
OS_FINDINGS: FULL
OD_FINDINGS: FULL

## 2025-04-01 ASSESSMENT — VISUAL ACUITY
OD_BCVA: 20/30
OS_BCVA: 20/25-2

## 2025-04-10 ENCOUNTER — APPOINTMENT (OUTPATIENT)
Dept: ALLERGY | Facility: CLINIC | Age: 75
End: 2025-04-10

## 2025-04-16 ENCOUNTER — APPOINTMENT (OUTPATIENT)
Dept: ALLERGY | Facility: CLINIC | Age: 75
End: 2025-04-16
Payer: MEDICARE

## 2025-04-16 VITALS
DIASTOLIC BLOOD PRESSURE: 65 MMHG | SYSTOLIC BLOOD PRESSURE: 115 MMHG | RESPIRATION RATE: 14 BRPM | HEART RATE: 75 BPM | OXYGEN SATURATION: 98 % | TEMPERATURE: 98.1 F

## 2025-04-16 PROCEDURE — 96372 THER/PROPH/DIAG INJ SC/IM: CPT

## 2025-04-16 RX ORDER — OMALIZUMAB 150 MG/ML
150 INJECTION, SOLUTION SUBCUTANEOUS
Qty: 0 | Refills: 0 | Status: COMPLETED | OUTPATIENT
Start: 2025-04-15

## 2025-04-28 ENCOUNTER — APPOINTMENT (OUTPATIENT)
Dept: ORTHOPEDIC SURGERY | Facility: CLINIC | Age: 75
End: 2025-04-28
Payer: MEDICARE

## 2025-04-28 VITALS — HEIGHT: 67 IN | WEIGHT: 230 LBS | BODY MASS INDEX: 36.1 KG/M2

## 2025-04-28 DIAGNOSIS — M48.02 SPINAL STENOSIS, CERVICAL REGION: ICD-10-CM

## 2025-04-28 DIAGNOSIS — M47.812 SPONDYLOSIS W/OUT MYELOPATHY OR RADICULOPATHY, CERVICAL REGION: ICD-10-CM

## 2025-04-28 DIAGNOSIS — M54.2 CERVICALGIA: ICD-10-CM

## 2025-04-28 DIAGNOSIS — M54.12 RADICULOPATHY, CERVICAL REGION: ICD-10-CM

## 2025-04-28 PROCEDURE — 99204 OFFICE O/P NEW MOD 45 MIN: CPT

## 2025-05-12 ENCOUNTER — APPOINTMENT (OUTPATIENT)
Dept: PAIN MANAGEMENT | Facility: CLINIC | Age: 75
End: 2025-05-12
Payer: MEDICARE

## 2025-05-12 VITALS — BODY MASS INDEX: 36.1 KG/M2 | WEIGHT: 230 LBS | HEIGHT: 67 IN

## 2025-05-12 DIAGNOSIS — M54.2 CERVICALGIA: ICD-10-CM

## 2025-05-12 DIAGNOSIS — M47.812 SPONDYLOSIS W/OUT MYELOPATHY OR RADICULOPATHY, CERVICAL REGION: ICD-10-CM

## 2025-05-12 PROCEDURE — 99204 OFFICE O/P NEW MOD 45 MIN: CPT

## 2025-05-15 ENCOUNTER — APPOINTMENT (OUTPATIENT)
Dept: ALLERGY | Facility: CLINIC | Age: 75
End: 2025-05-15
Payer: MEDICARE

## 2025-05-15 VITALS
HEART RATE: 78 BPM | DIASTOLIC BLOOD PRESSURE: 74 MMHG | TEMPERATURE: 97.6 F | OXYGEN SATURATION: 98 % | SYSTOLIC BLOOD PRESSURE: 130 MMHG

## 2025-05-15 PROCEDURE — 96372 THER/PROPH/DIAG INJ SC/IM: CPT

## 2025-06-11 ENCOUNTER — APPOINTMENT (OUTPATIENT)
Dept: ALLERGY | Facility: CLINIC | Age: 75
End: 2025-06-11
Payer: MEDICARE

## 2025-06-11 PROCEDURE — 96372 THER/PROPH/DIAG INJ SC/IM: CPT

## 2025-07-10 ENCOUNTER — APPOINTMENT (OUTPATIENT)
Dept: ALLERGY | Facility: CLINIC | Age: 75
End: 2025-07-10
Payer: MEDICARE

## 2025-07-10 VITALS
RESPIRATION RATE: 14 BRPM | DIASTOLIC BLOOD PRESSURE: 65 MMHG | SYSTOLIC BLOOD PRESSURE: 115 MMHG | OXYGEN SATURATION: 98 % | HEART RATE: 70 BPM | TEMPERATURE: 98 F

## 2025-07-10 PROCEDURE — 99214 OFFICE O/P EST MOD 30 MIN: CPT | Mod: 25

## 2025-07-10 PROCEDURE — 96372 THER/PROPH/DIAG INJ SC/IM: CPT

## 2025-08-09 DIAGNOSIS — L50.1 IDIOPATHIC URTICARIA: ICD-10-CM

## 2025-08-11 ENCOUNTER — APPOINTMENT (OUTPATIENT)
Dept: ALLERGY | Facility: CLINIC | Age: 75
End: 2025-08-11
Payer: MEDICARE

## 2025-08-11 VITALS
DIASTOLIC BLOOD PRESSURE: 72 MMHG | OXYGEN SATURATION: 97 % | SYSTOLIC BLOOD PRESSURE: 120 MMHG | HEART RATE: 69 BPM | TEMPERATURE: 98.3 F

## 2025-08-11 PROCEDURE — 96372 THER/PROPH/DIAG INJ SC/IM: CPT

## 2025-08-11 RX ORDER — OMALIZUMAB 150 MG/ML
150 INJECTION, SOLUTION SUBCUTANEOUS
Qty: 0 | Refills: 0 | Status: COMPLETED | OUTPATIENT
Start: 2025-08-09

## 2025-09-05 ENCOUNTER — RX RENEWAL (OUTPATIENT)
Age: 75
End: 2025-09-05

## 2025-09-10 ENCOUNTER — APPOINTMENT (OUTPATIENT)
Dept: ALLERGY | Facility: CLINIC | Age: 75
End: 2025-09-10
Payer: MEDICARE

## 2025-09-10 VITALS
TEMPERATURE: 98.3 F | HEART RATE: 81 BPM | SYSTOLIC BLOOD PRESSURE: 114 MMHG | OXYGEN SATURATION: 97 % | DIASTOLIC BLOOD PRESSURE: 64 MMHG

## 2025-09-10 PROCEDURE — 96372 THER/PROPH/DIAG INJ SC/IM: CPT
